# Patient Record
Sex: FEMALE | Race: BLACK OR AFRICAN AMERICAN | Employment: FULL TIME | URBAN - METROPOLITAN AREA
[De-identification: names, ages, dates, MRNs, and addresses within clinical notes are randomized per-mention and may not be internally consistent; named-entity substitution may affect disease eponyms.]

---

## 2017-03-10 ENCOUNTER — HOSPITAL ENCOUNTER (OUTPATIENT)
Dept: ULTRASOUND IMAGING | Age: 32
Discharge: HOME | End: 2017-03-10
Attending: NURSE PRACTITIONER
Payer: COMMERCIAL

## 2017-03-10 DIAGNOSIS — M79.661 PAIN IN RIGHT LOWER LEG: ICD-10-CM

## 2017-03-10 PROCEDURE — 93971 EXTREMITY STUDY: CPT

## 2020-04-28 PROBLEM — K59.01 SLOW TRANSIT CONSTIPATION: Status: ACTIVE | Noted: 2019-04-03

## 2020-05-13 PROBLEM — O20.0 THREATENED ABORTION: Status: ACTIVE | Noted: 2020-05-13

## 2020-05-24 RX ORDER — SODIUM CHLORIDE 0.9 % (FLUSH) 0.9 %
5-40 SYRINGE (ML) INJECTION AS NEEDED
Status: CANCELLED | OUTPATIENT
Start: 2020-05-24

## 2020-05-24 RX ORDER — SODIUM CHLORIDE 0.9 % (FLUSH) 0.9 %
5-40 SYRINGE (ML) INJECTION EVERY 8 HOURS
Status: CANCELLED | OUTPATIENT
Start: 2020-05-24

## 2020-05-24 NOTE — H&P
Carloz Garcia  is a 37 y.o. female, . No LMP recorded (lmp unknown). , who is being seen for follow up ultrasound on incomplete AB. Vaginal spotting only when wiping. Denies vaginal cramping. Ultrasound on 20: Uterine size of 65ml. Lining is 8mm. Uterus is antevered. Lining appears to be thick with debris and small amout of fluid at fundus. No gest sac or fetal pole seen. Ovaries wnl. No free fluid. Left adnexa with tubular hydrosalpinx seen- looks clear not blood filled. HCG Quant on 5/15/20: 878  HCG Quant on 20: 836    Ultrasound today's reveals cx contains small amount of simple fluid. Uterus is anteverted and heterogeneous. ENDO= 10 mm with debris and vascular components suggesting RPOC. ROV visualized with follicles and appear wnl. Right adnexa appears wnl. LOV visualized with follicles and appears wnl. Left adnexa contains paraovarian simple cysts measuring 1.7 cm and 0.9 cm. Only 2 visualized. Tubular structure not visualized. Discussed that still having bleeding and spotting. Offered cytotec to expulse uterine contents or D&C. This has been going on since . She is ready to be done. HISTORY:    OB History        3    Para   1    Term           1    AB   2    Living   1       SAB   2    TAB        Ectopic        Molar        Multiple        Live Births   1              GYN History         Sexual History:   Social History     Substance and Sexual Activity   Sexual Activity Not Currently    Partners: Male    Birth control/protection: None          has a past medical history of Abnormal Pap smear of cervix (2018), ASCUS with positive high risk HPV cervical (2018), Preeclampsia, and Vertigo.  .    Past Surgical History:   Procedure Laterality Date    HX  SECTION      x1    HX COLPOSCOPY  2015    With Bx- LGSIL       Her current meds are:    Current Outpatient Medications:     metroNIDAZOLE (FLAGYL) 500 mg tablet, Take BID x 7 days, Disp: 14 Tab, Rfl: 0    linaCLOtide (LINZESS) 145 mcg cap capsule, Take 1 Cap by mouth Daily (before breakfast). , Disp: 90 Cap, Rfl: 3    valACYclovir (VALTREX) 1 gram tablet, Take 1 Tab by mouth daily. Suppressive therapy, Disp: 90 Tab, Rfl: 3    FLUoxetine (PROZAC) 20 mg capsule, Take  by mouth daily. , Disp: , Rfl:     dextroamphetamine-amphetamine (ADDERALL) 15 mg tablet, Take 30 mg by mouth.     , Disp: , Rfl:     famciclovir (FAMVIR) 250 mg tablet, BID x 2 days, Disp: 30 Tab, Rfl: 0    ospemifene (OSPHENA) 60 mg tab tablet, Take 1 Tab by mouth daily. , Disp: 30 Tab, Rfl: 5    temazepam (RESTORIL) 30 mg capsule, Take  by mouth nightly as needed for Sleep., Disp: , Rfl:      ROS   Neg except hpi     PHYSICAL EXAM:  Visit Vitals  /68   Temp 97.5 °F (36.4 °C) (Temporal)   Ht 5' 7\" (1.702 m)   Wt 175 lb (79.4 kg)   LMP  (LMP Unknown)   BMI 27.41 kg/m²     Physical Exam   General:  Well nourished well developed female in nad   Heart rrr  Lungs cta b&s  Abdomen soft nontender. No enlargement of liver. Extremities: no calf pain    ASSESSMENT / PLAN:  Diagnoses and all orders for this visit:    1. Threatened  in early pregnancy  -     [facility]    2. Retained products of conception, early pregnancy  -     US UTS TRANSVAGINAL OB    discussed options. Would like to proceed with cytotec with risk bleeding, infection uterine perforation. She is aware. Will schedule. Npo after midnight.        Yasmeen Way DO

## 2020-05-25 ENCOUNTER — ANESTHESIA EVENT (OUTPATIENT)
Dept: SURGERY | Age: 35
End: 2020-05-25
Payer: COMMERCIAL

## 2020-05-25 RX ORDER — FENTANYL CITRATE 50 UG/ML
100 INJECTION, SOLUTION INTRAMUSCULAR; INTRAVENOUS AS NEEDED
Status: CANCELLED | OUTPATIENT
Start: 2020-05-25

## 2020-05-26 ENCOUNTER — HOSPITAL ENCOUNTER (OUTPATIENT)
Age: 35
Setting detail: OUTPATIENT SURGERY
Discharge: HOME | End: 2020-05-26
Attending: OBSTETRICS & GYNECOLOGY | Admitting: OBSTETRICS & GYNECOLOGY
Payer: COMMERCIAL

## 2020-05-26 ENCOUNTER — ANESTHESIA (OUTPATIENT)
Dept: SURGERY | Age: 35
End: 2020-05-26
Payer: COMMERCIAL

## 2020-05-26 VITALS
HEART RATE: 87 BPM | RESPIRATION RATE: 16 BRPM | WEIGHT: 178.7 LBS | SYSTOLIC BLOOD PRESSURE: 106 MMHG | TEMPERATURE: 97.8 F | BODY MASS INDEX: 28.72 KG/M2 | OXYGEN SATURATION: 99 % | DIASTOLIC BLOOD PRESSURE: 65 MMHG | HEIGHT: 66 IN

## 2020-05-26 DIAGNOSIS — O20.0 THREATENED ABORTION: Primary | ICD-10-CM

## 2020-05-26 PROCEDURE — 77030018836 HC SOL IRR NACL ICUM -A: Performed by: OBSTETRICS & GYNECOLOGY

## 2020-05-26 PROCEDURE — 74011250636 HC RX REV CODE- 250/636: Performed by: ANESTHESIOLOGY

## 2020-05-26 PROCEDURE — 74011250637 HC RX REV CODE- 250/637: Performed by: ANESTHESIOLOGY

## 2020-05-26 PROCEDURE — 74011250636 HC RX REV CODE- 250/636: Performed by: NURSE ANESTHETIST, CERTIFIED REGISTERED

## 2020-05-26 PROCEDURE — 77030009368: Performed by: OBSTETRICS & GYNECOLOGY

## 2020-05-26 PROCEDURE — 77030040922 HC BLNKT HYPOTHRM STRY -A: Performed by: ANESTHESIOLOGY

## 2020-05-26 PROCEDURE — 88305 TISSUE EXAM BY PATHOLOGIST: CPT

## 2020-05-26 PROCEDURE — 74011000250 HC RX REV CODE- 250: Performed by: NURSE ANESTHETIST, CERTIFIED REGISTERED

## 2020-05-26 PROCEDURE — 77030008579 HC TBNG UTER SUC CARD -A: Performed by: OBSTETRICS & GYNECOLOGY

## 2020-05-26 PROCEDURE — 76010000154 HC OR TIME FIRST 0.5 HR: Performed by: OBSTETRICS & GYNECOLOGY

## 2020-05-26 PROCEDURE — 76210000006 HC OR PH I REC 0.5 TO 1 HR: Performed by: OBSTETRICS & GYNECOLOGY

## 2020-05-26 PROCEDURE — 77030012317 HC CATH URET INT COVD -A: Performed by: OBSTETRICS & GYNECOLOGY

## 2020-05-26 PROCEDURE — 76060000032 HC ANESTHESIA 0.5 TO 1 HR: Performed by: OBSTETRICS & GYNECOLOGY

## 2020-05-26 PROCEDURE — 77030010509 HC AIRWY LMA MSK TELE -A: Performed by: ANESTHESIOLOGY

## 2020-05-26 PROCEDURE — 76210000020 HC REC RM PH II FIRST 0.5 HR: Performed by: OBSTETRICS & GYNECOLOGY

## 2020-05-26 PROCEDURE — 77030040361 HC SLV COMPR DVT MDII -B: Performed by: OBSTETRICS & GYNECOLOGY

## 2020-05-26 RX ORDER — IBUPROFEN 600 MG/1
600 TABLET ORAL
Qty: 20 TAB | Refills: 0 | Status: SHIPPED | OUTPATIENT
Start: 2020-05-26

## 2020-05-26 RX ORDER — SODIUM CHLORIDE, SODIUM LACTATE, POTASSIUM CHLORIDE, CALCIUM CHLORIDE 600; 310; 30; 20 MG/100ML; MG/100ML; MG/100ML; MG/100ML
1000 INJECTION, SOLUTION INTRAVENOUS CONTINUOUS
Status: DISCONTINUED | OUTPATIENT
Start: 2020-05-26 | End: 2020-05-26 | Stop reason: HOSPADM

## 2020-05-26 RX ORDER — MIDAZOLAM HYDROCHLORIDE 1 MG/ML
2 INJECTION, SOLUTION INTRAMUSCULAR; INTRAVENOUS
Status: COMPLETED | OUTPATIENT
Start: 2020-05-26 | End: 2020-05-26

## 2020-05-26 RX ORDER — HYDROCODONE BITARTRATE AND ACETAMINOPHEN 5; 325 MG/1; MG/1
1 TABLET ORAL
Qty: 12 TAB | Refills: 0 | Status: SHIPPED | OUTPATIENT
Start: 2020-05-26 | End: 2020-05-29

## 2020-05-26 RX ORDER — OXYCODONE HYDROCHLORIDE 5 MG/1
5 TABLET ORAL
Status: DISCONTINUED | OUTPATIENT
Start: 2020-05-26 | End: 2020-05-26 | Stop reason: HOSPADM

## 2020-05-26 RX ORDER — SODIUM CHLORIDE, SODIUM LACTATE, POTASSIUM CHLORIDE, CALCIUM CHLORIDE 600; 310; 30; 20 MG/100ML; MG/100ML; MG/100ML; MG/100ML
75 INJECTION, SOLUTION INTRAVENOUS CONTINUOUS
Status: DISCONTINUED | OUTPATIENT
Start: 2020-05-26 | End: 2020-05-26 | Stop reason: HOSPADM

## 2020-05-26 RX ORDER — ONDANSETRON 2 MG/ML
4 INJECTION INTRAMUSCULAR; INTRAVENOUS ONCE
Status: DISCONTINUED | OUTPATIENT
Start: 2020-05-26 | End: 2020-05-26 | Stop reason: HOSPADM

## 2020-05-26 RX ORDER — LIDOCAINE HYDROCHLORIDE 10 MG/ML
0.1 INJECTION INFILTRATION; PERINEURAL AS NEEDED
Status: DISCONTINUED | OUTPATIENT
Start: 2020-05-26 | End: 2020-05-26 | Stop reason: HOSPADM

## 2020-05-26 RX ORDER — NALOXONE HYDROCHLORIDE 0.4 MG/ML
0.1 INJECTION, SOLUTION INTRAMUSCULAR; INTRAVENOUS; SUBCUTANEOUS AS NEEDED
Status: DISCONTINUED | OUTPATIENT
Start: 2020-05-26 | End: 2020-05-26 | Stop reason: HOSPADM

## 2020-05-26 RX ORDER — HYDROMORPHONE HYDROCHLORIDE 2 MG/ML
0.5 INJECTION, SOLUTION INTRAMUSCULAR; INTRAVENOUS; SUBCUTANEOUS
Status: DISCONTINUED | OUTPATIENT
Start: 2020-05-26 | End: 2020-05-26 | Stop reason: HOSPADM

## 2020-05-26 RX ORDER — LIDOCAINE HYDROCHLORIDE 20 MG/ML
INJECTION, SOLUTION EPIDURAL; INFILTRATION; INTRACAUDAL; PERINEURAL AS NEEDED
Status: DISCONTINUED | OUTPATIENT
Start: 2020-05-26 | End: 2020-05-26 | Stop reason: HOSPADM

## 2020-05-26 RX ORDER — ONDANSETRON 2 MG/ML
INJECTION INTRAMUSCULAR; INTRAVENOUS AS NEEDED
Status: DISCONTINUED | OUTPATIENT
Start: 2020-05-26 | End: 2020-05-26 | Stop reason: HOSPADM

## 2020-05-26 RX ORDER — IBUPROFEN 600 MG/1
600 TABLET ORAL
Qty: 20 TAB | Refills: 0 | Status: SHIPPED | OUTPATIENT
Start: 2020-05-26 | End: 2020-05-26 | Stop reason: SDUPTHER

## 2020-05-26 RX ORDER — HYDROCODONE BITARTRATE AND ACETAMINOPHEN 5; 325 MG/1; MG/1
1 TABLET ORAL
Qty: 12 TAB | Refills: 0 | Status: SHIPPED | OUTPATIENT
Start: 2020-05-26 | End: 2020-05-26 | Stop reason: SDUPTHER

## 2020-05-26 RX ORDER — PROPOFOL 10 MG/ML
INJECTION, EMULSION INTRAVENOUS AS NEEDED
Status: DISCONTINUED | OUTPATIENT
Start: 2020-05-26 | End: 2020-05-26 | Stop reason: HOSPADM

## 2020-05-26 RX ORDER — DEXAMETHASONE SODIUM PHOSPHATE 4 MG/ML
INJECTION, SOLUTION INTRA-ARTICULAR; INTRALESIONAL; INTRAMUSCULAR; INTRAVENOUS; SOFT TISSUE AS NEEDED
Status: DISCONTINUED | OUTPATIENT
Start: 2020-05-26 | End: 2020-05-26 | Stop reason: HOSPADM

## 2020-05-26 RX ORDER — DIPHENHYDRAMINE HYDROCHLORIDE 50 MG/ML
12.5 INJECTION, SOLUTION INTRAMUSCULAR; INTRAVENOUS ONCE
Status: DISCONTINUED | OUTPATIENT
Start: 2020-05-26 | End: 2020-05-26 | Stop reason: HOSPADM

## 2020-05-26 RX ORDER — OXYCODONE HYDROCHLORIDE 5 MG/1
10 TABLET ORAL
Status: COMPLETED | OUTPATIENT
Start: 2020-05-26 | End: 2020-05-26

## 2020-05-26 RX ORDER — FENTANYL CITRATE 50 UG/ML
INJECTION, SOLUTION INTRAMUSCULAR; INTRAVENOUS AS NEEDED
Status: DISCONTINUED | OUTPATIENT
Start: 2020-05-26 | End: 2020-05-26 | Stop reason: HOSPADM

## 2020-05-26 RX ADMIN — ONDANSETRON 4 MG: 2 INJECTION INTRAMUSCULAR; INTRAVENOUS at 11:30

## 2020-05-26 RX ADMIN — OXYCODONE 10 MG: 5 TABLET ORAL at 12:20

## 2020-05-26 RX ADMIN — SODIUM CHLORIDE, SODIUM LACTATE, POTASSIUM CHLORIDE, AND CALCIUM CHLORIDE 1000 ML: 600; 310; 30; 20 INJECTION, SOLUTION INTRAVENOUS at 09:29

## 2020-05-26 RX ADMIN — SODIUM CHLORIDE, SODIUM LACTATE, POTASSIUM CHLORIDE, AND CALCIUM CHLORIDE: 600; 310; 30; 20 INJECTION, SOLUTION INTRAVENOUS at 11:17

## 2020-05-26 RX ADMIN — FENTANYL CITRATE 50 MCG: 50 INJECTION INTRAMUSCULAR; INTRAVENOUS at 11:22

## 2020-05-26 RX ADMIN — LIDOCAINE HYDROCHLORIDE 60 MG: 20 INJECTION, SOLUTION EPIDURAL; INFILTRATION; INTRACAUDAL; PERINEURAL at 11:22

## 2020-05-26 RX ADMIN — DEXAMETHASONE SODIUM PHOSPHATE 10 MG: 4 INJECTION, SOLUTION INTRAMUSCULAR; INTRAVENOUS at 11:30

## 2020-05-26 RX ADMIN — FENTANYL CITRATE 50 MCG: 50 INJECTION INTRAMUSCULAR; INTRAVENOUS at 11:32

## 2020-05-26 RX ADMIN — PROPOFOL 200 MG: 10 INJECTION, EMULSION INTRAVENOUS at 11:22

## 2020-05-26 RX ADMIN — MIDAZOLAM 2 MG: 1 INJECTION INTRAMUSCULAR; INTRAVENOUS at 10:11

## 2020-05-26 RX ADMIN — HYDROMORPHONE HYDROCHLORIDE 0.5 MG: 2 INJECTION INTRAMUSCULAR; INTRAVENOUS; SUBCUTANEOUS at 11:56

## 2020-05-26 NOTE — ANESTHESIA PREPROCEDURE EVALUATION
Relevant Problems   No relevant active problems       Anesthetic History   No history of anesthetic complications            Review of Systems / Medical History  Patient summary reviewed and pertinent labs reviewed    Pulmonary  Within defined limits                 Neuro/Psych              Cardiovascular    Hypertension                   GI/Hepatic/Renal                Endo/Other  Within defined limits           Other Findings   Comments: Pre E in past           Physical Exam    Airway  Mallampati: II  TM Distance: 4 - 6 cm  Neck ROM: normal range of motion   Mouth opening: Normal     Cardiovascular    Rhythm: regular  Rate: normal         Dental  No notable dental hx       Pulmonary  Breath sounds clear to auscultation               Abdominal  GI exam deferred       Other Findings            Anesthetic Plan    ASA: 2  Anesthesia type: general            Anesthetic plan and risks discussed with: Patient

## 2020-05-26 NOTE — ANESTHESIA POSTPROCEDURE EVALUATION
Procedure(s):  DILATATION AND CURETTAGE WITH SUCTION/ RETAINED PRODUCTS.     general    Anesthesia Post Evaluation        Patient location during evaluation: PACU  Patient participation: complete - patient participated  Level of consciousness: awake  Pain management: satisfactory to patient  Airway patency: patent  Anesthetic complications: no  Cardiovascular status: hemodynamically stable  Respiratory status: spontaneous ventilation  Hydration status: euvolemic  Post anesthesia nausea and vomiting:  none      Vitals Value Taken Time   /65 5/26/2020 12:21 PM   Temp 36.6 °C (97.8 °F) 5/26/2020 11:51 AM   Pulse 87 5/26/2020 12:21 PM   Resp 16 5/26/2020 12:21 PM   SpO2 99 % 5/26/2020 12:21 PM

## 2020-05-26 NOTE — BRIEF OP NOTE
Brief Postoperative Note    Patient: Stan Muñoz  YOB: 1985  MRN: [id number]    Date of Procedure: 5/26/2020     Pre-Op Diagnosis: Retained products of conception after miscarriage [O03.4]    Post-Op Diagnosis: Same as preoperative diagnosis.       Procedure(s):  DILATATION AND CURETTAGE WITH SUCTION/ RETAINED PRODUCTS    Surgeon(s):  Mary Richardson MD    Surgical Assistant: None    Anesthesia: General     Estimated Blood Loss (mL): less than 537     Complications: None    Specimens:   ID Type Source Tests Collected by Time Destination   1 : Products of conception ALICIA Peralesinz of Conception  Mary Richardson MD 5/26/2020 1106 Pathology        Findings: Normal EGBSUVVC, moderate amount of POC    Electronically Signed by Edwige Iniguez MD on 5/26/2020 at 11:45 AM

## 2020-05-26 NOTE — OP NOTES
[address] [address]  OPERATIVE REPORT    Name:  Jacek Orta  MR#:  [id number]  :  1985  ACCOUNT #:  [de-identified]  DATE OF SERVICE:  2020    PREOPERATIVE DIAGNOSIS:  Retained products of pregnancy after miscarriage. POSTOPERATIVE DIAGNOSIS:  Retained products of pregnancy after miscarriage. PROCEDURE PERFORMED:  Suction dilatation and curettage. SURGEON:  Janie Sanz MD    ANESTHESIA:  General anesthesia. COMPLICATIONS:  None. SPECIMENS REMOVED:  Products of conception. ESTIMATED BLOOD LOSS:  100 mL. IV FLUIDS:  500 mL of crystalloid. URINE OUTPUT:  100 mL of clear urine drained prior to the case. FINDINGS:  Normal-appearing external genitalia, Bartholin, Morning Sun, urethral glands, vulva, vagina, and cervix. Cervix was noted to be dilated to a diameter of 27-South African without the need for mechanical dilatation. PROCEDURE:  The patient was taken to the operating room, where general anesthesia was obtained without difficulty. She was sterilely prepped and draped in dorsal lithotomy position in [address] [place] [address] in the usual sterile fashion. After confirming the patient's identification and the nature of the procedure, a weighted sterile speculum was placed in the patient's posterior fornix of the vagina and the anterior lip of the cervix was visualized with a narrow Sabine. It was grasped with a single-tooth tenaculum and the narrow Sabine was removed. The uterus sounded to a depth of 8 cm. The Hegar dilators were utilized to assess the cervical dilatation and the patient was found to be dilated spontaneously to a diameter of 27-South African without the need for mechanical dilatation. The curved 7 mm suction curette was gently introduced into the endometrial cavity and suction applied. The curette was withdrawn in a spinning fashion effectively evacuating all products of conception.   Sharp curettage was then undertaken until a gritty texture was appreciated throughout the entire endometrial cavity and the suction curette was gently introduced again into the endometrial cavity. It was again withdrawn in a spinning fashion effectively evacuating any remaining products of conception. Minimal bleeding was noted throughout the procedure. The tenaculum was removed from the cervix with minimal bleeding noted from tenaculum sites and the case deemed concluded. The patient tolerated the procedure well. All counts were correct. The patient went to recovery room in stable condition.       Renetta Langston MD      [id number]  D:  05/26/2020 11:49  T:  05/26/2020 19:07  JOB #:  [id number]

## 2020-05-26 NOTE — PERIOP NOTES
[address] May 26, 2020       RE: Indra Humphrey      To Whom It May Concern,    This is to certify that Indra Humphrey may may return to work on 05/28/2020. Please feel free to contact my office if you have any questions or concerns. Thank you for your assistance in this matter.       Sincerely,  Dr. Ifeanyi Crawley  [phone]

## 2020-05-26 NOTE — DISCHARGE INSTRUCTIONS
Dilation and Curettage (D&C): What to Expect at Home  Your Recovery  Dilation and curettage (D&C) is a procedure to remove tissue from the inside of the uterus. The doctor used a curved tool, called a curette, to gently scrape tissue from your uterus. You are likely to have a backache, or cramps similar to menstrual cramps, and pass small clots of blood from your vagina for the first few days. You may continue to have light vaginal bleeding for several weeks after the procedure. You will probably be able to go back to most of your normal activities in 1 or 2 days. This care sheet gives you a general idea about how long it will take for you to recover. But each person recovers at a different pace. Follow the steps below to get better as quickly as possible. How can you care for yourself at home? Activity  · Rest when you feel tired. Getting enough sleep will help you recover. · Avoid strenuous activities, such as bicycle riding, jogging, weight lifting, or aerobic exercise, until your doctor says it is okay. · Most women are able to return to work the day after the procedure. · You may have some light vaginal bleeding. Wear sanitary pads if needed. Do not douche or use tampons for 2 weeks or until your doctor says it is okay. · No vaginal intercourse for 2 weeks or until cleared by your doctor. Diet  · You can eat your normal diet. If your stomach is upset, try bland, low-fat foods like plain rice, broiled chicken, toast, and yogurt. · Drink plenty of fluids (unless your doctor tells you not to). Medicines  · Take pain medicines exactly as directed. ¨ If the doctor gave you a prescription medicine for pain, take it as prescribed. ¨ If you are not taking a prescription pain medicine, ask your doctor if you can take an over-the-counter medicine. · If you think your pain medicine is making you sick to your stomach:  ¨ Take your medicine after meals (unless your doctor has told you not to).   ¨ Ask your doctor for a different pain medicine. · If your doctor prescribed antibiotics, take them as directed. Do not stop taking them just because you feel better. You need to take the full course of antibiotics. Follow-up care is a key part of your treatment and safety. Be sure to make and go to all appointments, and call your doctor if you are having problems. Its also a good idea to know your test results and keep a list of the medicines you take. When should you call for help? Call 911 anytime you think you may need emergency care. For example, call if:  · You passed out (lost consciousness). · You have severe trouble breathing. · You have chest pain and shortness of breath, or you cough up blood. · You have severe pain in your belly. Call your doctor now or seek immediate medical care if:  · You have bright red vaginal bleeding that soaks one or more pads each hour for 2 or more hours. · You pass blood clots that are larger than a golf ball. · You have vaginal discharge that smells bad. · You are sick to your stomach or cannot keep fluids down. · You have pain that does not get better after you take pain medicine. · You have pain that is getting worse 2 days after the procedure. · You have a fever over 100°F.  · Your belly feels tender, or full and hard. Watch closely for changes in your health, and be sure to contact your doctor if:  You do not get better as expected. After general anesthesia or intravenous sedation, for 24 hours or while taking prescription Narcotics:  · Limit your activities  · A responsible adult needs to be with you for the next 24 hours  · Do not drive and operate hazardous machinery  · Do not make important personal or business decisions  · Do not drink alcoholic beverages  · If you have not urinated within 8 hours after discharge, please contact your surgeon on call. · If you have sleep apnea and have a CPAP machine, please use it for all naps and sleeping.   · Please use caution when taking narcotics and any of your home medications that may cause drowsiness. *  Please give a list of your current medications to your Primary Care Provider. *  Please update this list whenever your medications are discontinued, doses are      changed, or new medications (including over-the-counter products) are added. *  Please carry medication information at all times in case of emergency situations. These are general instructions for a healthy lifestyle:  No smoking/ No tobacco products/ Avoid exposure to second hand smoke  Surgeon General's Warning:  Quitting smoking now greatly reduces serious risk to your health. Obesity, smoking, and sedentary lifestyle greatly increases your risk for illness  A healthy diet, regular physical exercise & weight monitoring are important for maintaining a healthy lifestyle    You may be retaining fluid if you have a history of heart failure or if you experience any of the following symptoms:  Weight gain of 3 pounds or more overnight or 5 pounds in a week, increased swelling in our hands or feet or shortness of breath while lying flat in bed. Please call your doctor as soon as you notice any of these symptoms; do not wait until your next office visit.

## 2022-03-18 PROBLEM — K59.01 SLOW TRANSIT CONSTIPATION: Status: ACTIVE | Noted: 2019-04-03

## 2022-03-19 PROBLEM — O20.0 THREATENED ABORTION: Status: ACTIVE | Noted: 2020-05-13

## 2022-09-07 ENCOUNTER — OFFICE VISIT (OUTPATIENT)
Dept: OBGYN CLINIC | Age: 37
End: 2022-09-07
Payer: COMMERCIAL

## 2022-09-07 VITALS
DIASTOLIC BLOOD PRESSURE: 68 MMHG | SYSTOLIC BLOOD PRESSURE: 118 MMHG | BODY MASS INDEX: 33.62 KG/M2 | WEIGHT: 214.2 LBS | HEIGHT: 67 IN

## 2022-09-07 DIAGNOSIS — Z11.3 SCREENING EXAMINATION FOR VENEREAL DISEASE: ICD-10-CM

## 2022-09-07 DIAGNOSIS — Z11.3 SCREEN FOR STD (SEXUALLY TRANSMITTED DISEASE): ICD-10-CM

## 2022-09-07 DIAGNOSIS — Z13.89 SCREENING FOR GENITOURINARY CONDITION: ICD-10-CM

## 2022-09-07 DIAGNOSIS — Z01.419 WELL WOMAN EXAM: Primary | ICD-10-CM

## 2022-09-07 DIAGNOSIS — Z11.51 SCREENING FOR HPV (HUMAN PAPILLOMAVIRUS): ICD-10-CM

## 2022-09-07 LAB
BILIRUBIN, URINE, POC: NEGATIVE
BLOOD URINE, POC: NEGATIVE
GLUCOSE URINE, POC: NEGATIVE
KETONES, URINE, POC: NEGATIVE
LEUKOCYTE ESTERASE, URINE, POC: NEGATIVE
NITRITE, URINE, POC: NEGATIVE
PH, URINE, POC: 6 (ref 4.6–8)
PROTEIN,URINE, POC: NEGATIVE
SPECIFIC GRAVITY, URINE, POC: 1.02 (ref 1–1.03)
URINALYSIS CLARITY, POC: CLEAR
URINALYSIS COLOR, POC: YELLOW
UROBILINOGEN, POC: NORMAL

## 2022-09-07 PROCEDURE — 99395 PREV VISIT EST AGE 18-39: CPT | Performed by: NURSE PRACTITIONER

## 2022-09-07 PROCEDURE — 81003 URINALYSIS AUTO W/O SCOPE: CPT | Performed by: NURSE PRACTITIONER

## 2022-09-07 RX ORDER — SERTRALINE HYDROCHLORIDE 100 MG/1
100 TABLET, FILM COATED ORAL DAILY
COMMUNITY

## 2022-09-07 NOTE — PROGRESS NOTES
Patient presents today for a routine gynecological examination with no complaints. Would like STD screening. Denies symptoms/known exposure. OB History          3    Para        Term           1    AB   2    Living   1         SAB        IAB        Ectopic        Molar        Multiple        Live Births                      GYN History     Last pap: 3/11/19 Neg/HPV Neg/Neg Cxs      Patient's last menstrual period was 2022 (exact date). Cycle Length 21 Lasting 3  negative dysmenorrhea; negative postcoital bleeding    Past Medical History:  Past Medical History:   Diagnosis Date    Abnormal Pap smear of cervix 2018    HPV+    ASCUS with positive high risk HPV cervical 2018    Preeclampsia     with first pregnancy    Vertigo        Past Surgical History:  Past Surgical History:   Procedure Laterality Date     SECTION      x1    COLPOSCOPY  2015    With Bx- LGSIL       Allergies:   No Known Allergies    Medication History:  Current Outpatient Medications   Medication Sig Dispense Refill    sertraline (ZOLOFT) 100 MG tablet Take 100 mg by mouth daily      amphetamine-dextroamphetamine (ADDERALL) 15 MG tablet Take 30 mg by mouth. No current facility-administered medications for this visit. Social History:  Social History     Socioeconomic History    Marital status: Single     Spouse name: Not on file    Number of children: Not on file    Years of education: Not on file    Highest education level: Not on file   Occupational History    Not on file   Tobacco Use    Smoking status: Never    Smokeless tobacco: Never   Substance and Sexual Activity    Alcohol use:  Yes     Alcohol/week: 0.0 standard drinks    Drug use: No    Sexual activity: Yes     Partners: Male     Birth control/protection: None   Other Topics Concern    Not on file   Social History Narrative    Lives with daughter Anisa Chavarria 08  In an on an doff relationship     Social Determinants of Health MICRO  - PAP IG, CT-NG-TV, Aptima HPV and rfx 16/18,45 ([id number]); Future  - PAP IG, CT-NG-TV, Aptima HPV and rfx 16/18,45 ([id number])    2. Screening for HPV (human papillomavirus)    - PAP IG, CT-NG-TV, Aptima HPV and rfx 16/18,45 ([id number]); Future  - PAP IG, CT-NG-TV, Aptima HPV and rfx 16/18,45 ([id number])    3. Screening for genitourinary condition    - AMB POC URINALYSIS DIP STICK AUTO W/O MICRO    4. Screening examination for venereal disease    - PAP IG, CT-NG-TV, Aptima HPV and rfx 16/18,45 ([id number]); Future  - PAP IG, CT-NG-TV, Aptima HPV and rfx 16/18,45 ([id number])    5. Screen for STD (sexually transmitted disease)    - HIV 1/2 Ag/Ab, 4TH Generation,W Rflx Confirm; Future  - Hepatitis C Antibody; Future  - Hepatitis B Surface Antigen; Future  - RPR; Future     pap smear with cultures  Would like blood testing also  return annually or prn    Supervising physician is Dr. Myla Bourne.

## 2022-09-08 LAB
HBV SURFACE AG SER QL: NONREACTIVE
HCV AB SER QL: NONREACTIVE
HIV 1+2 AB+HIV1 P24 AG SERPL QL IA: NONREACTIVE
HIV 1/2 RESULT COMMENT: NORMAL
RPR SER QL: NONREACTIVE

## 2022-09-12 LAB
C TRACH RRNA CVX QL NAA+PROBE: NEGATIVE
CYTOLOGIST CVX/VAG CYTO: NORMAL
CYTOLOGY CVX/VAG DOC THIN PREP: NORMAL
HPV APTIMA: NEGATIVE
Lab: NORMAL
N GONORRHOEA RRNA CVX QL NAA+PROBE: NEGATIVE
PATH REPORT.FINAL DX SPEC: NORMAL
STAT OF ADQ CVX/VAG CYTO-IMP: NORMAL
T VAGINALIS RRNA SPEC QL NAA+PROBE: NEGATIVE

## 2022-11-14 ENCOUNTER — HOSPITAL ENCOUNTER (OUTPATIENT)
Dept: LAB | Age: 37
Discharge: HOME | End: 2022-11-17
Payer: SELF-PAY

## 2022-11-14 LAB — HGB BLD-MCNC: 13 G/DL (ref 11.7–15.4)

## 2022-11-14 PROCEDURE — 36415 COLL VENOUS BLD VENIPUNCTURE: CPT

## 2022-11-14 PROCEDURE — 85018 HEMOGLOBIN: CPT

## 2022-11-14 RX ORDER — ACETAMINOPHEN 500 MG
1000 TABLET ORAL 2 TIMES DAILY
COMMUNITY

## 2022-11-14 RX ORDER — LANOLIN ALCOHOL/MO/W.PET/CERES
325 CREAM (GRAM) TOPICAL
COMMUNITY
Start: 2021-03-19

## 2022-11-14 NOTE — PERIOP NOTE
Phone pre-assessment completed. Verified name&  . Order to obtain consent not found in EHR, however patient verifies case posting. Type 1B surgery,  assessment complete. Orders NOT received. Labs per surgeon: unknown  Labs per anesthesia protocol: HGB- signed and held- Pt to come to [facility] today 22. Chart marked for charge nurse review. Medical/surgical history questions answered at their best of ability. All prior to admission medications reviewed and documented in [facility]. Instructed to take ONLY THE FOLLOWING MEDICATIONS ON THE DAY OF SURGERY according to anesthesia guidelines with sips of water: none . VERBALIZES UNDERSTANDING TO HOLD ALL VITAMINS AND SUPPLEMENTS 7 DAYS PRIOR TO SURGERY DATE (with exception to Iron supplement) and NSAIDS 5 DAYS PRIOR TO SURGERY DATE PER ANESTHESIA PROTOCOL. Instructed on the following:    > Arrive at [facility], time of arrival to be called the day before by 1700  > NPO after midnight including gum, mints, and ice chips  > Responsible adult must drive patient to the hospital, stay during surgery, and patient will need supervision 24 hours after anesthesia  > Use antibacterial soap in shower the night before surgery and on the morning of surgery  > All piercings must be removed prior to arrival.    > Leave all valuables (money and jewelry) at home but bring insurance card and ID on DOS.   > You may be required to pay a deductible or co-pay on the day of your procedure. You can pre-pay by calling [phone] if your surgery is at the [facility] or [phone] if your surgery is at the [facility]. > Do not wear make-up, nail polish, lotions, cologne, perfumes, powders, or oil on skin. Artificial nails are not permitted. Teach back successful and demonstrates knowledge of instruction. You will received a call from the pre-op nurse by 5 pm on the business day prior to the scheduled procedure.  If you have not spoken with a nurse, please check your voicemail. If you have not received an arrival time by 5 pm, please call [phone].

## 2022-11-15 ENCOUNTER — ANESTHESIA EVENT (OUTPATIENT)
Dept: SURGERY | Age: 37
End: 2022-11-15

## 2022-11-16 ENCOUNTER — ANESTHESIA (OUTPATIENT)
Dept: SURGERY | Age: 37
End: 2022-11-16

## 2022-11-16 ENCOUNTER — HOSPITAL ENCOUNTER (OUTPATIENT)
Age: 37
Setting detail: OUTPATIENT SURGERY
Discharge: HOME | End: 2022-11-16
Attending: SPECIALIST | Admitting: SPECIALIST

## 2022-11-16 VITALS
HEART RATE: 87 BPM | TEMPERATURE: 97 F | RESPIRATION RATE: 16 BRPM | DIASTOLIC BLOOD PRESSURE: 71 MMHG | OXYGEN SATURATION: 95 % | HEIGHT: 66 IN | BODY MASS INDEX: 34.55 KG/M2 | SYSTOLIC BLOOD PRESSURE: 122 MMHG | WEIGHT: 215 LBS

## 2022-11-16 LAB
ANION GAP SERPL CALC-SCNC: 6 MMOL/L (ref 2–11)
BASOPHILS # BLD: 0.1 K/UL (ref 0–0.2)
BASOPHILS NFR BLD: 1 % (ref 0–2)
BUN SERPL-MCNC: 10 MG/DL (ref 6–23)
CALCIUM SERPL-MCNC: 8.9 MG/DL (ref 8.3–10.4)
CHLORIDE SERPL-SCNC: 109 MMOL/L (ref 101–110)
CO2 SERPL-SCNC: 24 MMOL/L (ref 21–32)
CREAT SERPL-MCNC: 0.9 MG/DL (ref 0.6–1)
DIFFERENTIAL METHOD BLD: ABNORMAL
EOSINOPHIL # BLD: 0.1 K/UL (ref 0–0.8)
EOSINOPHIL NFR BLD: 2 % (ref 0.5–7.8)
ERYTHROCYTE [DISTWIDTH] IN BLOOD BY AUTOMATED COUNT: 15.5 % (ref 11.9–14.6)
GLUCOSE SERPL-MCNC: 94 MG/DL (ref 65–100)
HCG UR QL: NEGATIVE
HCT VFR BLD AUTO: 41.7 % (ref 35.8–46.3)
HGB BLD-MCNC: 13.1 G/DL (ref 11.7–15.4)
IMM GRANULOCYTES # BLD AUTO: 0 K/UL (ref 0–0.5)
IMM GRANULOCYTES NFR BLD AUTO: 0 % (ref 0–5)
LYMPHOCYTES # BLD: 2.3 K/UL (ref 0.5–4.6)
LYMPHOCYTES NFR BLD: 41 % (ref 13–44)
MCH RBC QN AUTO: 26.6 PG (ref 26.1–32.9)
MCHC RBC AUTO-ENTMCNC: 31.4 G/DL (ref 31.4–35)
MCV RBC AUTO: 84.6 FL (ref 82–102)
MONOCYTES # BLD: 0.5 K/UL (ref 0.1–1.3)
MONOCYTES NFR BLD: 9 % (ref 4–12)
NEUTS SEG # BLD: 2.7 K/UL (ref 1.7–8.2)
NEUTS SEG NFR BLD: 47 % (ref 43–78)
NRBC # BLD: 0 K/UL (ref 0–0.2)
PLATELET # BLD AUTO: 320 K/UL (ref 150–450)
PMV BLD AUTO: 9.8 FL (ref 9.4–12.3)
POTASSIUM SERPL-SCNC: 4.8 MMOL/L (ref 3.5–5.1)
RBC # BLD AUTO: 4.93 M/UL (ref 4.05–5.2)
SODIUM SERPL-SCNC: 139 MMOL/L (ref 133–143)
WBC # BLD AUTO: 5.6 K/UL (ref 4.3–11.1)

## 2022-11-16 PROCEDURE — 2580000003 HC RX 258: Performed by: ANESTHESIOLOGY

## 2022-11-16 PROCEDURE — 6370000000 HC RX 637 (ALT 250 FOR IP): Performed by: ANESTHESIOLOGY

## 2022-11-16 PROCEDURE — 6360000002 HC RX W HCPCS: Performed by: SPECIALIST

## 2022-11-16 PROCEDURE — 3600000003 HC SURGERY LEVEL 3 BASE: Performed by: SPECIALIST

## 2022-11-16 PROCEDURE — 6370000000 HC RX 637 (ALT 250 FOR IP): Performed by: SPECIALIST

## 2022-11-16 PROCEDURE — 3700000000 HC ANESTHESIA ATTENDED CARE: Performed by: SPECIALIST

## 2022-11-16 PROCEDURE — 81025 URINE PREGNANCY TEST: CPT

## 2022-11-16 PROCEDURE — 6360000002 HC RX W HCPCS: Performed by: ANESTHESIOLOGY

## 2022-11-16 PROCEDURE — 3600000013 HC SURGERY LEVEL 3 ADDTL 15MIN: Performed by: SPECIALIST

## 2022-11-16 PROCEDURE — 2500000003 HC RX 250 WO HCPCS: Performed by: SPECIALIST

## 2022-11-16 PROCEDURE — 85025 COMPLETE CBC W/AUTO DIFF WBC: CPT

## 2022-11-16 PROCEDURE — 2500000003 HC RX 250 WO HCPCS: Performed by: NURSE ANESTHETIST, CERTIFIED REGISTERED

## 2022-11-16 PROCEDURE — 7100000010 HC PHASE II RECOVERY - FIRST 15 MIN: Performed by: SPECIALIST

## 2022-11-16 PROCEDURE — 2709999900 HC NON-CHARGEABLE SUPPLY: Performed by: SPECIALIST

## 2022-11-16 PROCEDURE — 2580000003 HC RX 258: Performed by: SPECIALIST

## 2022-11-16 PROCEDURE — 3700000001 HC ADD 15 MINUTES (ANESTHESIA): Performed by: SPECIALIST

## 2022-11-16 PROCEDURE — 7100000011 HC PHASE II RECOVERY - ADDTL 15 MIN: Performed by: SPECIALIST

## 2022-11-16 PROCEDURE — 7100000001 HC PACU RECOVERY - ADDTL 15 MIN: Performed by: SPECIALIST

## 2022-11-16 PROCEDURE — 80048 BASIC METABOLIC PNL TOTAL CA: CPT

## 2022-11-16 PROCEDURE — 7100000000 HC PACU RECOVERY - FIRST 15 MIN: Performed by: SPECIALIST

## 2022-11-16 PROCEDURE — 6360000002 HC RX W HCPCS: Performed by: NURSE ANESTHETIST, CERTIFIED REGISTERED

## 2022-11-16 RX ORDER — BACITRACIN, NEOMYCIN, POLYMYXIN B 400; 3.5; 5 [USP'U]/G; MG/G; [USP'U]/G
OINTMENT TOPICAL PRN
Status: DISCONTINUED | OUTPATIENT
Start: 2022-11-16 | End: 2022-11-16 | Stop reason: HOSPADM

## 2022-11-16 RX ORDER — SODIUM CHLORIDE 0.9 % (FLUSH) 0.9 %
5-40 SYRINGE (ML) INJECTION EVERY 12 HOURS SCHEDULED
Status: DISCONTINUED | OUTPATIENT
Start: 2022-11-16 | End: 2022-11-16 | Stop reason: HOSPADM

## 2022-11-16 RX ORDER — EPINEPHRINE 1 MG/ML(1)
AMPUL (ML) INJECTION PRN
Status: DISCONTINUED | OUTPATIENT
Start: 2022-11-16 | End: 2022-11-16 | Stop reason: HOSPADM

## 2022-11-16 RX ORDER — DIPHENHYDRAMINE HYDROCHLORIDE 50 MG/ML
12.5 INJECTION INTRAMUSCULAR; INTRAVENOUS
Status: DISCONTINUED | OUTPATIENT
Start: 2022-11-16 | End: 2022-11-16 | Stop reason: HOSPADM

## 2022-11-16 RX ORDER — APREPITANT 40 MG/1
40 CAPSULE ORAL ONCE
Status: COMPLETED | OUTPATIENT
Start: 2022-11-16 | End: 2022-11-16

## 2022-11-16 RX ORDER — SODIUM CHLORIDE 0.9 % (FLUSH) 0.9 %
5-40 SYRINGE (ML) INJECTION PRN
Status: DISCONTINUED | OUTPATIENT
Start: 2022-11-16 | End: 2022-11-16 | Stop reason: HOSPADM

## 2022-11-16 RX ORDER — PROCHLORPERAZINE EDISYLATE 5 MG/ML
5 INJECTION INTRAMUSCULAR; INTRAVENOUS
Status: COMPLETED | OUTPATIENT
Start: 2022-11-16 | End: 2022-11-16

## 2022-11-16 RX ORDER — GLYCOPYRROLATE 0.2 MG/ML
INJECTION INTRAMUSCULAR; INTRAVENOUS PRN
Status: DISCONTINUED | OUTPATIENT
Start: 2022-11-16 | End: 2022-11-16 | Stop reason: SDUPTHER

## 2022-11-16 RX ORDER — ROCURONIUM BROMIDE 10 MG/ML
INJECTION, SOLUTION INTRAVENOUS PRN
Status: DISCONTINUED | OUTPATIENT
Start: 2022-11-16 | End: 2022-11-16 | Stop reason: SDUPTHER

## 2022-11-16 RX ORDER — LIDOCAINE HYDROCHLORIDE 10 MG/ML
INJECTION, SOLUTION INFILTRATION; PERINEURAL PRN
Status: DISCONTINUED | OUTPATIENT
Start: 2022-11-16 | End: 2022-11-16 | Stop reason: HOSPADM

## 2022-11-16 RX ORDER — NEOSTIGMINE METHYLSULFATE 1 MG/ML
INJECTION, SOLUTION INTRAVENOUS PRN
Status: DISCONTINUED | OUTPATIENT
Start: 2022-11-16 | End: 2022-11-16 | Stop reason: SDUPTHER

## 2022-11-16 RX ORDER — FENTANYL CITRATE 50 UG/ML
INJECTION, SOLUTION INTRAMUSCULAR; INTRAVENOUS PRN
Status: DISCONTINUED | OUTPATIENT
Start: 2022-11-16 | End: 2022-11-16 | Stop reason: SDUPTHER

## 2022-11-16 RX ORDER — MIDAZOLAM HYDROCHLORIDE 2 MG/2ML
2 INJECTION, SOLUTION INTRAMUSCULAR; INTRAVENOUS
Status: DISCONTINUED | OUTPATIENT
Start: 2022-11-16 | End: 2022-11-16 | Stop reason: HOSPADM

## 2022-11-16 RX ORDER — HALOPERIDOL 5 MG/ML
1 INJECTION INTRAMUSCULAR
Status: DISCONTINUED | OUTPATIENT
Start: 2022-11-16 | End: 2022-11-16 | Stop reason: HOSPADM

## 2022-11-16 RX ORDER — OXYCODONE HYDROCHLORIDE 5 MG/1
5 TABLET ORAL
Status: COMPLETED | OUTPATIENT
Start: 2022-11-16 | End: 2022-11-16

## 2022-11-16 RX ORDER — SODIUM CHLORIDE, SODIUM LACTATE, POTASSIUM CHLORIDE, CALCIUM CHLORIDE 600; 310; 30; 20 MG/100ML; MG/100ML; MG/100ML; MG/100ML
INJECTION, SOLUTION INTRAVENOUS CONTINUOUS
Status: DISCONTINUED | OUTPATIENT
Start: 2022-11-16 | End: 2022-11-16 | Stop reason: HOSPADM

## 2022-11-16 RX ORDER — DEXTROSE MONOHYDRATE 100 MG/ML
INJECTION, SOLUTION INTRAVENOUS CONTINUOUS PRN
Status: DISCONTINUED | OUTPATIENT
Start: 2022-11-16 | End: 2022-11-16 | Stop reason: HOSPADM

## 2022-11-16 RX ORDER — LIDOCAINE HYDROCHLORIDE 10 MG/ML
1 INJECTION, SOLUTION INFILTRATION; PERINEURAL
Status: DISCONTINUED | OUTPATIENT
Start: 2022-11-16 | End: 2022-11-16 | Stop reason: HOSPADM

## 2022-11-16 RX ORDER — HYDROMORPHONE HYDROCHLORIDE 2 MG/ML
0.25 INJECTION, SOLUTION INTRAMUSCULAR; INTRAVENOUS; SUBCUTANEOUS EVERY 5 MIN PRN
Status: DISCONTINUED | OUTPATIENT
Start: 2022-11-16 | End: 2022-11-16 | Stop reason: HOSPADM

## 2022-11-16 RX ORDER — PROPOFOL 10 MG/ML
INJECTION, EMULSION INTRAVENOUS PRN
Status: DISCONTINUED | OUTPATIENT
Start: 2022-11-16 | End: 2022-11-16 | Stop reason: SDUPTHER

## 2022-11-16 RX ORDER — SCOLOPAMINE TRANSDERMAL SYSTEM 1 MG/1
1 PATCH, EXTENDED RELEASE TRANSDERMAL ONCE
Status: DISCONTINUED | OUTPATIENT
Start: 2022-11-16 | End: 2022-11-16 | Stop reason: HOSPADM

## 2022-11-16 RX ORDER — ONDANSETRON 2 MG/ML
INJECTION INTRAMUSCULAR; INTRAVENOUS PRN
Status: DISCONTINUED | OUTPATIENT
Start: 2022-11-16 | End: 2022-11-16 | Stop reason: SDUPTHER

## 2022-11-16 RX ORDER — ACETAMINOPHEN 500 MG
1000 TABLET ORAL ONCE
Status: COMPLETED | OUTPATIENT
Start: 2022-11-16 | End: 2022-11-16

## 2022-11-16 RX ORDER — SODIUM CHLORIDE 9 MG/ML
INJECTION, SOLUTION INTRAVENOUS PRN
Status: DISCONTINUED | OUTPATIENT
Start: 2022-11-16 | End: 2022-11-16 | Stop reason: HOSPADM

## 2022-11-16 RX ORDER — DEXAMETHASONE SODIUM PHOSPHATE 4 MG/ML
INJECTION, SOLUTION INTRA-ARTICULAR; INTRALESIONAL; INTRAMUSCULAR; INTRAVENOUS; SOFT TISSUE PRN
Status: DISCONTINUED | OUTPATIENT
Start: 2022-11-16 | End: 2022-11-16 | Stop reason: SDUPTHER

## 2022-11-16 RX ORDER — HYDROMORPHONE HYDROCHLORIDE 2 MG/ML
0.5 INJECTION, SOLUTION INTRAMUSCULAR; INTRAVENOUS; SUBCUTANEOUS EVERY 5 MIN PRN
Status: DISCONTINUED | OUTPATIENT
Start: 2022-11-16 | End: 2022-11-16 | Stop reason: HOSPADM

## 2022-11-16 RX ORDER — HEPARIN SODIUM 5000 [USP'U]/ML
5000 INJECTION, SOLUTION INTRAVENOUS; SUBCUTANEOUS ONCE
Status: COMPLETED | OUTPATIENT
Start: 2022-11-16 | End: 2022-11-16

## 2022-11-16 RX ORDER — LIDOCAINE HYDROCHLORIDE 20 MG/ML
INJECTION, SOLUTION EPIDURAL; INFILTRATION; INTRACAUDAL; PERINEURAL PRN
Status: DISCONTINUED | OUTPATIENT
Start: 2022-11-16 | End: 2022-11-16 | Stop reason: SDUPTHER

## 2022-11-16 RX ORDER — SODIUM CHLORIDE, SODIUM LACTATE, POTASSIUM CHLORIDE, AND CALCIUM CHLORIDE .6; .31; .03; .02 G/100ML; G/100ML; G/100ML; G/100ML
INJECTION, SOLUTION INTRAVENOUS CONTINUOUS PRN
Status: DISCONTINUED | OUTPATIENT
Start: 2022-11-16 | End: 2022-11-16 | Stop reason: HOSPADM

## 2022-11-16 RX ORDER — KETAMINE HYDROCHLORIDE 50 MG/ML
INJECTION, SOLUTION, CONCENTRATE INTRAMUSCULAR; INTRAVENOUS PRN
Status: DISCONTINUED | OUTPATIENT
Start: 2022-11-16 | End: 2022-11-16 | Stop reason: SDUPTHER

## 2022-11-16 RX ADMIN — TRANEXAMIC ACID 1000 MG: 1 INJECTION, SOLUTION INTRAVENOUS at 06:00

## 2022-11-16 RX ADMIN — SODIUM CHLORIDE, SODIUM LACTATE, POTASSIUM CHLORIDE, AND CALCIUM CHLORIDE: 600; 310; 30; 20 INJECTION, SOLUTION INTRAVENOUS at 09:23

## 2022-11-16 RX ADMIN — OXYCODONE 5 MG: 5 TABLET ORAL at 10:20

## 2022-11-16 RX ADMIN — PROCHLORPERAZINE EDISYLATE 5 MG: 5 INJECTION INTRAMUSCULAR; INTRAVENOUS at 09:58

## 2022-11-16 RX ADMIN — DEXAMETHASONE SODIUM PHOSPHATE 4 MG: 4 INJECTION, SOLUTION INTRAMUSCULAR; INTRAVENOUS at 07:35

## 2022-11-16 RX ADMIN — HEPARIN SODIUM 5000 UNITS: 5000 INJECTION INTRAVENOUS; SUBCUTANEOUS at 06:02

## 2022-11-16 RX ADMIN — PROPOFOL 50 MG: 10 INJECTION, EMULSION INTRAVENOUS at 09:14

## 2022-11-16 RX ADMIN — KETAMINE HYDROCHLORIDE 20 MG: 50 INJECTION, SOLUTION INTRAMUSCULAR; INTRAVENOUS at 07:20

## 2022-11-16 RX ADMIN — KETAMINE HYDROCHLORIDE 20 MG: 50 INJECTION, SOLUTION INTRAMUSCULAR; INTRAVENOUS at 08:09

## 2022-11-16 RX ADMIN — Medication 2000 MG: at 06:59

## 2022-11-16 RX ADMIN — ACETAMINOPHEN 1000 MG: 500 TABLET ORAL at 06:02

## 2022-11-16 RX ADMIN — ROCURONIUM BROMIDE 10 MG: 50 INJECTION, SOLUTION INTRAVENOUS at 08:32

## 2022-11-16 RX ADMIN — GLYCOPYRROLATE 0.3 MG: 0.2 INJECTION, SOLUTION INTRAMUSCULAR; INTRAVENOUS at 09:23

## 2022-11-16 RX ADMIN — FENTANYL CITRATE 100 MCG: 50 INJECTION, SOLUTION INTRAMUSCULAR; INTRAVENOUS at 07:10

## 2022-11-16 RX ADMIN — PROPOFOL 200 MG: 10 INJECTION, EMULSION INTRAVENOUS at 07:10

## 2022-11-16 RX ADMIN — ONDANSETRON 4 MG: 2 INJECTION INTRAMUSCULAR; INTRAVENOUS at 09:23

## 2022-11-16 RX ADMIN — APREPITANT 40 MG: 40 CAPSULE ORAL at 06:02

## 2022-11-16 RX ADMIN — SODIUM CHLORIDE, SODIUM LACTATE, POTASSIUM CHLORIDE, AND CALCIUM CHLORIDE: 600; 310; 30; 20 INJECTION, SOLUTION INTRAVENOUS at 05:45

## 2022-11-16 RX ADMIN — LIDOCAINE HYDROCHLORIDE 60 MG: 20 INJECTION, SOLUTION EPIDURAL; INFILTRATION; INTRACAUDAL; PERINEURAL at 07:10

## 2022-11-16 RX ADMIN — Medication 3 MG: at 09:23

## 2022-11-16 RX ADMIN — ROCURONIUM BROMIDE 50 MG: 50 INJECTION, SOLUTION INTRAVENOUS at 07:11

## 2022-11-16 ASSESSMENT — PAIN DESCRIPTION - DESCRIPTORS: DESCRIPTORS: ACHING

## 2022-11-16 ASSESSMENT — PAIN SCALES - GENERAL: PAINLEVEL_OUTOF10: 6

## 2022-11-16 ASSESSMENT — PAIN DESCRIPTION - LOCATION: LOCATION: ABDOMEN

## 2022-11-16 ASSESSMENT — PAIN - FUNCTIONAL ASSESSMENT: PAIN_FUNCTIONAL_ASSESSMENT: 0-10

## 2022-11-16 NOTE — OR NURSING
ABRAM King RN UPDATED PATIENT'S SUPPORT PERSON, [address], VIA PHONE CALL AT [phone].

## 2022-11-16 NOTE — ANESTHESIA POSTPROCEDURE EVALUATION
Department of Anesthesiology  Postprocedure Note    Patient: Dwayne Hermosillo  MRN: [id number]  YOB: 1985  Date of evaluation: 11/16/2022      Procedure Summary     Date: 11/16/22 Room / Location: [facility] OP OR 05 / [facility]    Anesthesia Start: 0700 Anesthesia Stop: 5009    Procedure: LIPOSUCTION OF TRUNK/COSMETIC (Abdomen) Diagnosis:       Encounter for cosmetic procedure      (Encounter for cosmetic procedure [Z41.1])    Surgeons: Caity Phepls MD Responsible Provider: Rebel Gamboa MD    Anesthesia Type: general ASA Status: 2          Anesthesia Type: No value filed. Dave Phase I: Dave Score: 10    Dave Phase II: Dave Score: 10      Anesthesia Post Evaluation    Patient location during evaluation: PACU  Patient participation: complete - patient participated  Level of consciousness: awake and alert  Airway patency: patent  Nausea: well controlled. Complications: no  Cardiovascular status: acceptable.   Respiratory status: acceptable  Hydration status: stable

## 2022-11-16 NOTE — OR NURSING
ABRAM King RN UPDATED PATIENT'S SUPPORT PERSON, [address], VIA PHONE Jelena AT [phone].

## 2022-11-16 NOTE — ANESTHESIA PRE PROCEDURE
Department of Anesthesiology  Preprocedure Note       Name:  Atanacio Mortimer   Age:  30 y.o.  :  1985                                          MRN:  [id number]         Date:  2022      Surgeon: Madeleine Burns):  Malorie Tejeda MD    Procedure: Procedure(s):  LIPOSUCTION OF TRUNK/COSMETIC    Medications prior to admission:   Prior to Admission medications    Medication Sig Start Date End Date Taking?  Authorizing Provider   Multiple Vitamins-Minerals (CENTRUM ADULTS PO) Take 1 tablet by mouth daily   Yes Historical Provider, MD   acetaminophen (TYLENOL) 500 MG tablet Take 1,000 mg by mouth in the morning and at bedtime   Yes Historical Provider, MD   ferrous sulfate (FE TABS 325) 325 (65 Fe) MG EC tablet Take 325 mg by mouth daily (with breakfast) 3/19/21  Yes Historical Provider, MD   sertraline (ZOLOFT) 100 MG tablet Take 100 mg by mouth nightly    Historical Provider, MD       Current medications:    Current Facility-Administered Medications   Medication Dose Route Frequency Provider Last Rate Last Admin    lidocaine 1 % injection 1 mL  1 mL IntraDERmal Once PRN Nehal Fermin MD        lactated ringers infusion   IntraVENous Continuous Nehal Fermin  mL/hr at 22 0545 New Bag at 22 0545    sodium chloride flush 0.9 % injection 5-40 mL  5-40 mL IntraVENous 2 times per day Nehal Fermin MD        sodium chloride flush 0.9 % injection 5-40 mL  5-40 mL IntraVENous PRN Nehal Fermin MD        0.9 % sodium chloride infusion   IntraVENous PRN Nehal Fermin MD        midazolam PF (VERSED) injection 2 mg  2 mg IntraVENous Once PRN Nehal Fermin MD        scopolamine (TRANSDERM-SCOP) transdermal patch 1 patch  1 patch TransDERmal Once Malorie Tejeda MD   1 patch at 22 0602    ceFAZolin (ANCEF) 2000 mg in sterile water 20 mL IV syringe  2,000 mg IntraVENous Once Malorie Tejeda MD           Allergies:  No Known Allergies    Problem List:    Patient Active Problem List Diagnosis Code    Slow transit constipation K59.01    Threatened  O20.0       Past Medical History:        Diagnosis Date    Abnormal Pap smear of cervix 2018    HPV+    Anemia     ORAL FE, DENIES HX OF BLOOD TRANSFUSIONS    Anxiety     ASCUS with positive high risk HPV cervical 2018    Preeclampsia     with first pregnancy    Vertigo        Past Surgical History:        Procedure Laterality Date     SECTION      x1    COLONOSCOPY  2022    COLPOSCOPY  2015    With Bx- LGSIL    DILATION AND CURETTAGE OF UTERUS  2020       Social History:    Social History     Tobacco Use    Smoking status: Never    Smokeless tobacco: Never   Substance Use Topics    Alcohol use: Yes     Alcohol/week: 4.0 standard drinks     Types: 4 Glasses of wine per week                                Counseling given: Not Answered      Vital Signs (Current):   Vitals:    22 1047 22 0536   BP:  (!) 145/84   Pulse:  97   Resp:  18   Temp:  99 °F (37.2 °C)   TempSrc:  Oral   SpO2:  98%   Weight: 215 lb (97.5 kg) 215 lb (97.5 kg)   Height: 5' 6\" (1.676 m)                                               BP Readings from Last 3 Encounters:   22 (!) 145/84   22 118/68       NPO Status: Time of last liquid consumption:                         Time of last solid consumption:                         Date of last liquid consumption: 11/15/22                        Date of last solid food consumption: 11/15/22    BMI:   Wt Readings from Last 3 Encounters:   22 215 lb (97.5 kg)   22 214 lb 3.2 oz (97.2 kg)     Body mass index is 34.7 kg/m².     CBC:   Lab Results   Component Value Date/Time    HGB 13.0 2022 02:57 PM       CMP:   Lab Results   Component Value Date/Time     2022 05:53 AM    K 4.8 2022 05:53 AM     2022 05:53 AM    CO2 24 2022 05:53 AM    BUN 10 2022 05:53 AM    CREATININE 0.90 2022 05:53 AM LABGLOM >60 11/16/2022 05:53 AM    GLUCOSE 94 11/16/2022 05:53 AM    CALCIUM 8.9 11/16/2022 05:53 AM       POC Tests: No results for input(s): POCGLU, POCNA, POCK, POCCL, POCBUN, POCHEMO, POCHCT in the last 72 hours. Coags: No results found for: PROTIME, INR, APTT    HCG (If Applicable):   Lab Results   Component Value Date    PREGTESTUR Negative 11/16/2022        ABGs: No results found for: PHART, PO2ART, FRX0RSL, MNU4OAV, BEART, W0PRLKXA     Type & Screen (If Applicable):  No results found for: LABABO, LABRH    Drug/Infectious Status (If Applicable):  Lab Results   Component Value Date/Time    HEPCAB NONREACTIVE 09/07/2022 02:13 PM       COVID-19 Screening (If Applicable):   Lab Results   Component Value Date/Time    COVID19 Not Detected 05/22/2020 01:26 PM           Anesthesia Evaluation  Patient summary reviewed and Nursing notes reviewed no history of anesthetic complications:   Airway: Mallampati: II  TM distance: >3 FB   Neck ROM: full  Mouth opening: > = 3 FB   Dental:      Comment: Fillings    Pulmonary:normal exam                               Cardiovascular:                      Neuro/Psych:   (+) depression/anxiety              ROS comment: ADHD    Vertigo GI/Hepatic/Renal:            ROS comment: Obesity. Endo/Other:    (+) blood dyscrasia: anemia:., .                 Abdominal:             Vascular: Other Findings:           Anesthesia Plan      general     ASA 2     (GA)  Induction: intravenous. MIPS: Postoperative opioids intended and Prophylactic antiemetics administered. Anesthetic plan and risks discussed with patient.                         Lashaun Hendrickson MD   11/16/2022

## 2022-11-16 NOTE — DISCHARGE INSTRUCTIONS
Liposuction: What to Expect at [address] [place] [address]  After liposuction, the area will be wrapped to help reduce swelling, bruising, and pain. The wrap may be a compression garment or elastic bandages. You may have to keep this wrap on for 3 to 4 weeks. If fat was removed from your calves or ankles, you may need to wear support hose for about 6 weeks. Fluid may drain from the cuts (incisions) for several days. The fluid will be bloody at first, but will turn clear in a few days. The area will probably be bruised and swollen for at least 10 to 14 days. You will be able to return to your normal activities as soon as you feel comfortable. This may take several days to a few weeks. Most people can return to light work within a few days. It may take longer to get back to normal if a lot of fat was removed. How can you care for yourself at home? Activity    Rest when you feel tired. Getting enough sleep will help you recover. Try to walk each day. Start by walking a little more than you did the day before. Bit by bit, increase the amount you walk. Walking boosts blood flow and helps prevent pneumonia and constipation. You will probably be able to return to work within a few days. Avoid strenuous activities, such as bicycle riding, jogging, weight lifting, or aerobic exercise, until your doctor says it is okay. This may be in 2 to 3 weeks. Diet    You can eat your normal diet. If your stomach is upset, try bland, low-fat foods like plain rice, broiled chicken, toast, and yogurt. Drink plenty of fluids (unless your doctor tells you not to). Medicines    Your doctor will tell you if and when you can restart your medicines. Your doctor will also give you instructions about taking any new medicines. If you stopped taking aspirin or some other blood thinner, your doctor will tell you when to start taking it again. If your doctor prescribed antibiotics, take them as directed.  Do not stop taking them just because you feel better. You need to take the full course of antibiotics. Take pain medicines exactly as directed. If the doctor gave you a prescription medicine for pain, take it as prescribed. If you are not taking a prescription pain medicine, ask your doctor if you can take an over-the-counter medicine. Incision care    You may have bandages under your compression garment. Your doctor will tell you when to remove the bandages and just use the compression garment. You may use light gauze under the compression garment if the cuts are draining a lot. Hygiene issues    You may shower 24 to 48 hours after surgery, if your doctor okays it. You may remove the compression wraps when you shower. Pat the cuts (incisions) dry. Do not take a bath for the first 2 weeks, or until your doctor tells you it is okay. Follow-up care is a key part of your treatment and safety. Be sure to make and go to all appointments, and call your doctor if you are having problems. It's also a good idea to know your test results and keep a list of the medicines you take. When should you call for help? Call 911 anytime you think you may need emergency care. For example, call if:  You passed out (lost consciousness). You have severe trouble breathing. You have symptoms of a blood clot in your lung (called a pulmonary embolism). These may include:  Sudden chest pain. Trouble breathing. Coughing up blood. Call your doctor now or seek immediate medical care if:  You have shortness of breath. You have new belly pain. You have signs of infection, such as: Increased pain, swelling, warmth, or redness. Red streaks leading from the incision. Pus draining from the incision. A fever. You have signs of a blood clot, such as:  Pain in your calf, back of the knee, thigh, or groin. Redness and swelling in your leg or groin.   You have continued bleeding that soaks a bandage after holding firm pressure for 10 minutes. Watch closely for changes in your health, and be sure to contact your doctor if you have any problems. MEDICATION INTERACTION:   During your procedure you potentially received a medication or medications which may reduce the effectiveness of oral contraceptives. Please consider other forms of contraception for 1 month following your procedure if you are currently using oral contraceptives as your primary form of birth control. In addition to this, we recommend continuing your oral contraceptive as prescribed, unless otherwise instructed by your physician, during this time. CALL YOUR DOCTOR IF   Excessive bleeding that does not stop after holding pressure over the area. Temperature of 101 degrees F or above. Excessive redness, swelling or bruising, and/or green or yellow, smelly discharge from incision. After general anesthesia or intravenous sedation, for 24 hours or while taking prescription Narcotics:   Limit your activities   A responsible adult needs to be with you for the next 24 hours   Do not drive and operate hazardous machinery   Do not make important personal or business decisions   Do not drink alcoholic beverages     If you have not urinated within 8 hours after discharge, and you are experiencing discomfort from urinary retention, please go to the nearest ED. If you have sleep apnea and have a CPAP machine, please use it for all naps and sleeping. Please use caution when taking narcotics and any of your home medications that may cause drowsiness. * Please give a list of your current medications to your Primary Care Provider. * Please update this list whenever your medications are discontinued, doses are   changed, or new medications (including over-the-counter products) are added. * Please carry medication information at all times in case of emergency situations.      These are general instructions for a healthy lifestyle:   No smoking/ No tobacco products/ Avoid exposure to second hand smoke   Surgeon General's Warning: Quitting smoking now greatly reduces serious risk to your health. Obesity, smoking, and sedentary lifestyle greatly increases your risk for illness   A healthy diet, regular physical exercise & weight monitoring are important for maintaining a healthy lifestyle   You may be retaining fluid if you have a history of heart failure or if you experience any of the following symptoms: Weight gain of 3 pounds or more overnight or 5 pounds in a week, increased swelling in our hands or feet or shortness of breath while lying flat in bed. Please call your doctor as soon as you notice any of these symptoms; do not wait until your next office visit.

## 2022-11-16 NOTE — OP NOTE
Patient: Rodo Alba  YOB: 1985  MRN: [id number]    Date of Procedure: 11/16/2022    PREOPERATIVE DIAGNOSIS:  Lipodystrophy of trunk     POSTOPERATIVE DIAGNOSIS:  Lipodystrophy of trunk     PROCEDURE PERFORMED:  Tumescent-assisted liposuction of trunk     SURGEON:  Giovani Banuelos MD     ASSISTANT:  Please see RN note. ANESTHESIA:  GETA     COMPLICATIONS:  None     SPECIMENS REMOVED:  None     IMPLANTS:  None     ESTIMATED BLOOD LOSS:  <125 cc     INDICATIONS:  This is a very pleasant 80-year-old white woman who presents for operative intervention. She desires contour improvement via tumescent-assisted liposuction. Safety is my primary concern, but we can expect 5000 mL of fat aspirate today. She understands all these and desires to proceed. Risks included but are not limited to infection, bleeding, asymmetry, wound healing problems, contour irregularity, incomplete resolution of symptoms, they understand. Again, they understand this is a multistep process and not a single procedure resolution of her issues. She understands all this and desires to proceed. FINDINGS:  5000 mL of fat aspirate total was removed. PROCEDURE:  After being marked preoperatively and brought to the OR, successful GETA was obtained. The areas were prepped and draped in a sterile fashion in the supine position. Tumescent fluid total of 4 liters were used. This consisted of warmed lactated Ringer's with 1 mL of epinephrine and 10 mL of 1% plain lidocaine per liter. A total of 4 liters were used. This was instilled into the subcutaneous tissue after a proper time-out procedure in the anterior and lateral abdominal wall. Of note, SCDs, pillow under the knees and padding of all pressure points were put into place. A 6-mm incision at the 12 o'clock position of the umbilicus, the lateral pubic area on either side made with a 15-blade scalpel.   Tumescent fluid was instilled throughout the anterior lateral abdominal subcutaneous fat and pubic area in the usual fashion. This was allowed to set up properly. A 6 and a 5 and a 4-mm Mercedes III blunt tip cannulas were used to suction away the redundant fat. Great care was taken to stay in the deep and intermediate planes to minimize contour irregularities as much as seemingly possible and to remove subjectively appropriate and objectively equal amounts of fat on either side. Visualization and palpation was used to determine the endpoint between management on the left and right sides. Focus on the upper abdominal wall lateral in a \"windshield wiper\" pattern was one focus, but the lateral hips and flanks above the hips on the anterolateral aspect of the abdominal wall were also addressed. A 4-mm cannula then was used to suction away redundancy of the pubis to feather out the redundancy here and give a nice contour to this area for her. The total operative time from incision to closure was 70 minutes after dressing application included in this time of the actual surgical time. Sterile dressing consisting of 4x4s, ABD pads, Kerlix roll x4, and a Medipore tape to secure this was applied over which a 3X abdominal garment was applied. Because the patient is tall, an abdominal binder Velcro corset was placed in the upper abdominal wall and applied. The patient was reversed from anesthesia and left the OR in stable condition with no complications.         Hussain Mccabe MD

## 2022-11-16 NOTE — ANESTHESIA PROCEDURE NOTES
Airway  Date/Time: 11/16/2022 7:13 AM  Urgency: elective    Airway not difficult    General Information and Staff    Patient location during procedure: OR  Resident/CRNA: ALLEN Quinones CRNA  Performed: resident/CRNA     Indications and Patient Condition  Indications for airway management: anesthesia  Spontaneous Ventilation: absent  Sedation level: deep  Preoxygenated: yes  Patient position: sniffing  MILS maintained throughout  Mask difficulty assessment: vent by bag mask    Final Airway Details  Final airway type: endotracheal airway      Successful airway: ETT  Cuffed: yes   Facilitating devices/methods: intubating stylet  Endotracheal tube insertion site: oral  Blade: Pili  Blade size: #4  ETT size (mm): 7.0  Placement verified by: chest auscultation and capnometry   Measured from: lips  ETT to lips (cm): 21  Number of attempts at approach: 1  Number of other approaches attempted: 0

## 2022-11-28 ENCOUNTER — PREP FOR PROCEDURE (OUTPATIENT)
Dept: SURGERY | Age: 37
End: 2022-11-28

## 2023-10-19 ENCOUNTER — HOSPITAL ENCOUNTER (EMERGENCY)
Age: 38
Discharge: HOME | End: 2023-10-19
Attending: EMERGENCY MEDICINE
Payer: COMMERCIAL

## 2023-10-19 VITALS
HEIGHT: 66 IN | DIASTOLIC BLOOD PRESSURE: 81 MMHG | SYSTOLIC BLOOD PRESSURE: 147 MMHG | HEART RATE: 94 BPM | BODY MASS INDEX: 33.75 KG/M2 | WEIGHT: 210 LBS | RESPIRATION RATE: 15 BRPM | TEMPERATURE: 99.1 F | OXYGEN SATURATION: 99 %

## 2023-10-19 DIAGNOSIS — O20.0 THREATENED MISCARRIAGE IN EARLY PREGNANCY: Primary | ICD-10-CM

## 2023-10-19 LAB
ANION GAP SERPL CALC-SCNC: 10 MMOL/L (ref 2–11)
APPEARANCE UR: ABNORMAL
BACTERIA URNS QL MICRO: ABNORMAL /HPF
BILIRUB UR QL: NEGATIVE
BUN SERPL-MCNC: 10 MG/DL (ref 6–23)
CALCIUM SERPL-MCNC: 8.9 MG/DL (ref 8.3–10.4)
CASTS URNS QL MICRO: 0 /LPF
CHLORIDE SERPL-SCNC: 104 MMOL/L (ref 98–107)
CO2 SERPL-SCNC: 22 MMOL/L (ref 21–32)
COLOR UR: ABNORMAL
CREAT SERPL-MCNC: 0.69 MG/DL (ref 0.6–1)
CRYSTALS URNS QL MICRO: 0 /LPF
EPI CELLS #/AREA URNS HPF: ABNORMAL /HPF
ERYTHROCYTE [DISTWIDTH] IN BLOOD BY AUTOMATED COUNT: 16.2 % (ref 11.9–14.6)
GLUCOSE SERPL-MCNC: 96 MG/DL (ref 65–100)
GLUCOSE UR STRIP.AUTO-MCNC: NEGATIVE MG/DL
HCG SERPL-ACNC: 284 MIU/ML (ref 0–6)
HCG UR QL: POSITIVE
HCT VFR BLD AUTO: 40.2 % (ref 35.8–46.3)
HGB BLD-MCNC: 12.8 G/DL (ref 11.7–15.4)
HGB UR QL STRIP: ABNORMAL
KETONES UR QL STRIP.AUTO: NEGATIVE MG/DL
LEUKOCYTE ESTERASE UR QL STRIP.AUTO: NEGATIVE
MCH RBC QN AUTO: 26.2 PG (ref 26.1–32.9)
MCHC RBC AUTO-ENTMCNC: 31.8 G/DL (ref 31.4–35)
MCV RBC AUTO: 82.4 FL (ref 82–102)
MUCOUS THREADS URNS QL MICRO: 0 /LPF
NITRITE UR QL STRIP.AUTO: NEGATIVE
NRBC # BLD: 0 K/UL (ref 0–0.2)
OTHER OBSERVATIONS: ABNORMAL
PH UR STRIP: 6 (ref 5–9)
PLATELET # BLD AUTO: 364 K/UL (ref 150–450)
PMV BLD AUTO: 9.1 FL (ref 9.4–12.3)
POTASSIUM SERPL-SCNC: 4 MMOL/L (ref 3.5–5.1)
PROT UR STRIP-MCNC: 30 MG/DL
RBC # BLD AUTO: 4.88 M/UL (ref 4.05–5.2)
RBC #/AREA URNS HPF: ABNORMAL /HPF
SODIUM SERPL-SCNC: 136 MMOL/L (ref 133–143)
SP GR UR REFRACTOMETRY: >=1.03 (ref 1–1.02)
UROBILINOGEN UR QL STRIP.AUTO: 0.2 EU/DL (ref 0.2–1)
WBC # BLD AUTO: 7.3 K/UL (ref 4.3–11.1)
WBC URNS QL MICRO: ABNORMAL /HPF

## 2023-10-19 PROCEDURE — 81001 URINALYSIS AUTO W/SCOPE: CPT

## 2023-10-19 PROCEDURE — 99283 EMERGENCY DEPT VISIT LOW MDM: CPT

## 2023-10-19 PROCEDURE — 84702 CHORIONIC GONADOTROPIN TEST: CPT

## 2023-10-19 PROCEDURE — 85027 COMPLETE CBC AUTOMATED: CPT

## 2023-10-19 PROCEDURE — 80048 BASIC METABOLIC PNL TOTAL CA: CPT

## 2023-10-19 PROCEDURE — 81025 URINE PREGNANCY TEST: CPT

## 2023-10-19 ASSESSMENT — PAIN SCALES - GENERAL: PAINLEVEL_OUTOF10: 5

## 2023-10-19 ASSESSMENT — ENCOUNTER SYMPTOMS
PHOTOPHOBIA: 0
COUGH: 0
CHEST TIGHTNESS: 0
VOICE CHANGE: 0
COLOR CHANGE: 0
BACK PAIN: 0
VOMITING: 0
ABDOMINAL PAIN: 0
NAUSEA: 0

## 2023-10-19 ASSESSMENT — LIFESTYLE VARIABLES
HOW MANY STANDARD DRINKS CONTAINING ALCOHOL DO YOU HAVE ON A TYPICAL DAY: PATIENT DOES NOT DRINK
HOW OFTEN DO YOU HAVE A DRINK CONTAINING ALCOHOL: NEVER

## 2023-10-19 ASSESSMENT — PAIN - FUNCTIONAL ASSESSMENT: PAIN_FUNCTIONAL_ASSESSMENT: 0-10

## 2023-10-19 ASSESSMENT — PAIN DESCRIPTION - DESCRIPTORS: DESCRIPTORS: CRAMPING

## 2023-10-19 ASSESSMENT — PAIN DESCRIPTION - LOCATION: LOCATION: ABDOMEN

## 2023-10-19 NOTE — ED TRIAGE NOTES
Pt ambulatory to triage with c/o vaginal bleeding and cramping. Pt reports being approx 4 weeks pregnant. Pt reports hx of miscarriage.

## 2023-10-19 NOTE — ED PROVIDER NOTES
Emergency Department Provider Note       PCP: ALLEN Brooks NP   Age: 41 y.o. Sex: female     DISPOSITION Decision To Discharge 10/19/2023 05:56:01 PM       ICD-10-CM    1. Threatened miscarriage in early pregnancy  O20.0           Medical Decision Making     Complexity of Problems Addressed:  1 or more acute illnesses that pose a threat to life or bodily function. Data Reviewed and Analyzed:  I independently ordered and reviewed each unique test.  I reviewed external records: ED visit note from an outside group. I reviewed external records: provider visit note from outside specialist.  I reviewed external records: previous lab results from outside ED. Discussion of management or test interpretation. Patient is well-appearing here with a nontender abdomen on exam.  Will obtain labs to include hCG quantitative. Review of records show patient's blood type is O+.    5:57 PM EDT  Laboratory data showed a hemoglobin that is stable. Patient's quantitative is 284. Blood type is O+    Discussed this with patient. Plan to discharge. She has a follow-up scheduled with OB/GYN on Monday. Will need a repeat hCG quantitative. Given threatened miscarriage precautions. Given immediate return to the ER precautions. Risk of Complications and/or Morbidity of Patient Management:  Shared medical decision making was utilized in creating the patients health plan today. History       Patient presents the ER with complaints of vaginal bleeding. Patient states she believes she is about 4 weeks pregnant. Earlier today started with some lower abdominal cramping and some vaginal bleeding. Denies any vaginal discharge. Denies any fevers or abdominal pain currently. Denies any nausea. The history is provided by the patient.    Vaginal Bleeding  Quality:  Bright red  Severity:  Mild  Onset quality:  Gradual  Duration:  3 hours  Progression:  Worsening  Chronicity:  New  Possible

## 2023-10-19 NOTE — DISCHARGE INSTRUCTIONS
Limit any strenuous activity  Follow-up with OB/GYN for repeat laboratory testing on Monday  Return to the ER for any new, worsening or life-threatening symptoms to include persistent bleeding, lightheadedness, worsening pain or fatigue

## 2023-10-23 ENCOUNTER — OFFICE VISIT (OUTPATIENT)
Dept: OBGYN CLINIC | Age: 38
End: 2023-10-23
Payer: COMMERCIAL

## 2023-10-23 VITALS
DIASTOLIC BLOOD PRESSURE: 76 MMHG | SYSTOLIC BLOOD PRESSURE: 122 MMHG | HEIGHT: 66 IN | BODY MASS INDEX: 34.55 KG/M2 | WEIGHT: 215 LBS

## 2023-10-23 DIAGNOSIS — O20.0 THREATENED ABORTION: Primary | ICD-10-CM

## 2023-10-23 LAB — HCG SERPL-ACNC: 1497 MIU/ML (ref 0–6)

## 2023-10-23 PROCEDURE — 99213 OFFICE O/P EST LOW 20 MIN: CPT | Performed by: NURSE PRACTITIONER

## 2023-10-23 NOTE — PROGRESS NOTES
Standing Status:   Future     Number of Occurrences:   1     Standing Expiration Date:   10/23/2024    HCG, Quantitative, Pregnancy     Standing Status:   Future     Standing Expiration Date:   10/23/2024           Supervising physician is Dr. Mavis De La Rosa. Greater than 50% of the 30 minute visit were spent in counseling to the above topics.

## 2023-10-25 ENCOUNTER — NURSE ONLY (OUTPATIENT)
Dept: OBGYN CLINIC | Age: 38
End: 2023-10-25

## 2023-10-25 DIAGNOSIS — O20.0 THREATENED ABORTION: ICD-10-CM

## 2023-10-25 LAB — HCG SERPL-ACNC: 2664 MIU/ML (ref 0–6)

## 2023-10-26 ENCOUNTER — TELEPHONE (OUTPATIENT)
Dept: OBGYN CLINIC | Age: 38
End: 2023-10-26

## 2023-10-26 NOTE — TELEPHONE ENCOUNTER
----- Message from [facility], APRN - CNP sent at 10/26/2023  7:31 AM EDT -----  Good rise

## 2023-11-13 ENCOUNTER — PATIENT MESSAGE (OUTPATIENT)
Dept: OBGYN CLINIC | Age: 38
End: 2023-11-13

## 2023-11-13 RX ORDER — SERTRALINE HYDROCHLORIDE 100 MG/1
100 TABLET, FILM COATED ORAL NIGHTLY
Qty: 30 TABLET | Refills: 9 | Status: SHIPPED | OUTPATIENT
Start: 2023-11-13

## 2023-11-13 NOTE — TELEPHONE ENCOUNTER
From: Jose Iqbal  Sent: 11/13/2023 11:47 AM EST  To: Anselmo Wu  Subject: RE: Silvia Merida to refill zoloft  ----- Message -----  From: Sara Guajardo  Sent: 11/13/2023 11:42 AM EST  To: ALLEN Maynard - CNP  Subject: FW: Zoloft     Can we refill zoloft?  ----- Message -----  From: Clay Fontaine RN  Sent: 11/13/2023 11:28 AM EST  To: Anselmo Wu  Subject: FW: Zoloft       ----- Message -----  From: Jose Iqbal  Sent: 11/13/2023 11:27 AM EST  To: *  Subject: Zoloft     Yes I take it nightly. [facility] [address] [facility] [id number].      Thanks you

## 2023-11-27 ENCOUNTER — INITIAL PRENATAL (OUTPATIENT)
Dept: OBGYN CLINIC | Age: 38
End: 2023-11-27
Payer: COMMERCIAL

## 2023-11-27 VITALS — BODY MASS INDEX: 35.87 KG/M2 | HEIGHT: 66 IN | WEIGHT: 223.2 LBS

## 2023-11-27 DIAGNOSIS — Z87.42 HISTORY OF ABNORMAL CERVICAL PAP SMEAR: ICD-10-CM

## 2023-11-27 DIAGNOSIS — O36.80X0 ENCOUNTER TO DETERMINE FETAL VIABILITY OF PREGNANCY, SINGLE OR UNSPECIFIED FETUS: ICD-10-CM

## 2023-11-27 DIAGNOSIS — O09.299 HISTORY OF PRE-ECLAMPSIA IN PRIOR PREGNANCY, CURRENTLY PREGNANT: ICD-10-CM

## 2023-11-27 DIAGNOSIS — F32.A ANXIETY AND DEPRESSION: ICD-10-CM

## 2023-11-27 DIAGNOSIS — O99.210 OBESITY IN PREGNANCY: ICD-10-CM

## 2023-11-27 DIAGNOSIS — O09.899 HISTORY OF PRETERM DELIVERY, CURRENTLY PREGNANT: ICD-10-CM

## 2023-11-27 DIAGNOSIS — Z98.891 HISTORY OF C-SECTION: ICD-10-CM

## 2023-11-27 DIAGNOSIS — F41.9 ANXIETY AND DEPRESSION: ICD-10-CM

## 2023-11-27 DIAGNOSIS — Z3A.09 9 WEEKS GESTATION OF PREGNANCY: ICD-10-CM

## 2023-11-27 DIAGNOSIS — O09.521 MULTIGRAVIDA OF ADVANCED MATERNAL AGE IN FIRST TRIMESTER: Primary | ICD-10-CM

## 2023-11-27 PROBLEM — O20.0 THREATENED ABORTION: Status: RESOLVED | Noted: 2020-05-13 | Resolved: 2023-11-27

## 2023-11-27 PROBLEM — O09.529 AMA (ADVANCED MATERNAL AGE) MULTIGRAVIDA 35+: Status: ACTIVE | Noted: 2023-11-27

## 2023-11-27 PROBLEM — K59.01 SLOW TRANSIT CONSTIPATION: Status: RESOLVED | Noted: 2019-04-03 | Resolved: 2023-11-27

## 2023-11-27 LAB
ABO + RH BLD: NORMAL
ALBUMIN SERPL-MCNC: 3.6 G/DL (ref 3.5–5)
ALBUMIN/GLOB SERPL: 0.9 (ref 0.4–1.6)
ALP SERPL-CCNC: 89 U/L (ref 50–136)
ALT SERPL-CCNC: 15 U/L (ref 12–65)
ANION GAP SERPL CALC-SCNC: 9 MMOL/L (ref 2–11)
AST SERPL-CCNC: 10 U/L (ref 15–37)
BASOPHILS # BLD: 0.1 K/UL (ref 0–0.2)
BASOPHILS NFR BLD: 1 % (ref 0–2)
BILIRUB SERPL-MCNC: 0.1 MG/DL (ref 0.2–1.1)
BLOOD GROUP ANTIBODIES SERPL: NORMAL
BUN SERPL-MCNC: 7 MG/DL (ref 6–23)
CALCIUM SERPL-MCNC: 10.2 MG/DL (ref 8.3–10.4)
CHLORIDE SERPL-SCNC: 104 MMOL/L (ref 101–110)
CO2 SERPL-SCNC: 25 MMOL/L (ref 21–32)
CREAT SERPL-MCNC: 0.7 MG/DL (ref 0.6–1)
CREAT UR-MCNC: 178 MG/DL
DIFFERENTIAL METHOD BLD: ABNORMAL
EOSINOPHIL # BLD: 0.1 K/UL (ref 0–0.8)
EOSINOPHIL NFR BLD: 1 % (ref 0.5–7.8)
ERYTHROCYTE [DISTWIDTH] IN BLOOD BY AUTOMATED COUNT: 15.6 % (ref 11.9–14.6)
EST. AVERAGE GLUCOSE BLD GHB EST-MCNC: 114 MG/DL
GLOBULIN SER CALC-MCNC: 4.1 G/DL (ref 2.8–4.5)
GLUCOSE SERPL-MCNC: 89 MG/DL (ref 65–100)
HBA1C MFR BLD: 5.6 % (ref 4.8–5.6)
HBV SURFACE AG SER QL: NONREACTIVE
HCT VFR BLD AUTO: 38.7 % (ref 35.8–46.3)
HCV AB SER QL: NONREACTIVE
HGB BLD-MCNC: 12 G/DL (ref 11.7–15.4)
HIV 1+2 AB+HIV1 P24 AG SERPL QL IA: NONREACTIVE
HIV 1/2 RESULT COMMENT: NORMAL
IMM GRANULOCYTES # BLD AUTO: 0 K/UL (ref 0–0.5)
IMM GRANULOCYTES NFR BLD AUTO: 0 % (ref 0–5)
LDH SERPL L TO P-CCNC: 164 U/L (ref 100–190)
LYMPHOCYTES # BLD: 2.7 K/UL (ref 0.5–4.6)
LYMPHOCYTES NFR BLD: 37 % (ref 13–44)
MCH RBC QN AUTO: 26.1 PG (ref 26.1–32.9)
MCHC RBC AUTO-ENTMCNC: 31 G/DL (ref 31.4–35)
MCV RBC AUTO: 84.3 FL (ref 82–102)
MONOCYTES # BLD: 0.5 K/UL (ref 0.1–1.3)
MONOCYTES NFR BLD: 7 % (ref 4–12)
NEUTS SEG # BLD: 3.9 K/UL (ref 1.7–8.2)
NEUTS SEG NFR BLD: 54 % (ref 43–78)
NRBC # BLD: 0 K/UL (ref 0–0.2)
PLATELET # BLD AUTO: 338 K/UL (ref 150–450)
PMV BLD AUTO: 9.5 FL (ref 9.4–12.3)
POTASSIUM SERPL-SCNC: 3.9 MMOL/L (ref 3.5–5.1)
PROT SERPL-MCNC: 7.7 G/DL (ref 6.3–8.2)
PROT UR-MCNC: 15 MG/DL
PROT/CREAT UR-RTO: 0.1
RBC # BLD AUTO: 4.59 M/UL (ref 4.05–5.2)
RUBV IGG SERPL IA-ACNC: 116.3 IU/ML
SODIUM SERPL-SCNC: 138 MMOL/L (ref 133–143)
URATE SERPL-MCNC: 3.8 MG/DL (ref 2.6–6)
WBC # BLD AUTO: 7.3 K/UL (ref 4.3–11.1)

## 2023-11-27 PROCEDURE — 76817 TRANSVAGINAL US OBSTETRIC: CPT | Performed by: OBSTETRICS & GYNECOLOGY

## 2023-11-27 NOTE — PROGRESS NOTES
Madelaine Rivers G3, P1 presents to the office today for NOB talk and ultrasound. EDC is 6/26/24 based off of LMP. Patient education was discussed including: nutrition, appropriate weight gain, diet, exercise, travel, hospital classes, breastfeeding/lactation services, flu vaccine, Tdap, glucola, GBS, and Corona Virus and Zika precautions. Genetic testing discussed in depth and patient elects NIPT. Patients past medical history is significant for AMA, hx PEC with G1, delivered by emergent C/S at 30 weeks. Baseline labs drawn today, advised ASA/Vit D at 12 weeks. She is to return to the office in 3 weeks for NOB exam. All questions answered and she voiced full understanding. She is encouraged to call the office with any questions or concerns.

## 2023-11-28 LAB
RPR SER QL: NONREACTIVE
VZV IGG SER IA-ACNC: 1911 INDEX

## 2023-11-29 LAB
HGB A MFR BLD: 97.5 % (ref 96.4–98.8)
HGB A2 MFR BLD COLUMN CHROM: 2.5 % (ref 1.8–3.2)
HGB F MFR BLD: 0 % (ref 0–2)
HGB FRACT BLD-IMP: NORMAL
HGB S MFR BLD: 0 %

## 2023-11-30 LAB
BACTERIA SPEC CULT: NORMAL
BACTERIA SPEC CULT: NORMAL
SERVICE CMNT-IMP: NORMAL

## 2023-12-13 ENCOUNTER — ROUTINE PRENATAL (OUTPATIENT)
Dept: OBGYN CLINIC | Age: 38
End: 2023-12-13
Payer: COMMERCIAL

## 2023-12-13 VITALS — DIASTOLIC BLOOD PRESSURE: 88 MMHG | WEIGHT: 221.6 LBS | SYSTOLIC BLOOD PRESSURE: 134 MMHG | BODY MASS INDEX: 35.77 KG/M2

## 2023-12-13 DIAGNOSIS — Z3A.12 12 WEEKS GESTATION OF PREGNANCY: ICD-10-CM

## 2023-12-13 DIAGNOSIS — O28.3 ABNORMAL ANTENATAL ULTRASOUND: Primary | ICD-10-CM

## 2023-12-13 DIAGNOSIS — R10.32 LLQ PAIN: ICD-10-CM

## 2023-12-13 DIAGNOSIS — O36.8390 ULTRASOUND SCAN DONE FOR INABILITY TO HEAR FETAL HEART TONES: ICD-10-CM

## 2023-12-13 DIAGNOSIS — N89.8 VAGINAL ODOR: Primary | ICD-10-CM

## 2023-12-13 DIAGNOSIS — Z13.89 SCREENING FOR GENITOURINARY CONDITION: ICD-10-CM

## 2023-12-13 DIAGNOSIS — R10.30 LOWER ABDOMINAL PAIN: ICD-10-CM

## 2023-12-13 DIAGNOSIS — Z36.82 ENCOUNTER FOR (NT) NUCHAL TRANSLUCENCY SCAN: ICD-10-CM

## 2023-12-13 LAB
BILIRUBIN, URINE, POC: NEGATIVE
BLOOD URINE, POC: NEGATIVE
GLUCOSE URINE, POC: NEGATIVE
GLUCOSE URINE, POC: NEGATIVE
KETONES, URINE, POC: NEGATIVE
LEUKOCYTE ESTERASE, URINE, POC: NEGATIVE
NITRITE, URINE, POC: NEGATIVE
PH, URINE, POC: 6 (ref 4.6–8)
PROTEIN,URINE, POC: 30
PROTEIN,URINE, POC: NORMAL
SPECIFIC GRAVITY, URINE, POC: 1.03 (ref 1–1.03)
URINALYSIS CLARITY, POC: CLEAR
URINALYSIS COLOR, POC: YELLOW
UROBILINOGEN, POC: NORMAL

## 2023-12-13 PROCEDURE — 99214 OFFICE O/P EST MOD 30 MIN: CPT | Performed by: NURSE PRACTITIONER

## 2023-12-13 PROCEDURE — 81002 URINALYSIS NONAUTO W/O SCOPE: CPT | Performed by: NURSE PRACTITIONER

## 2023-12-13 PROCEDURE — 76817 TRANSVAGINAL US OBSTETRIC: CPT | Performed by: NURSE PRACTITIONER

## 2023-12-13 NOTE — PROGRESS NOTES
Adiyta Huggins is a 41 y.o. [id number] at 12w0d weeks gestation who is seen for  sharp lower abdominal pain (now specifically LLQ), dark brown discharge, and vaginal odor . Onset was 2 weeks ago. Symptoms have been gradually worsening since. Denies itching. Denies burning/frequency with urination. Denies flank pain/hematuria. Denies new partners. Also notes has been having issues with  constipation and hemorrhoids. Denies VB, LOV, Cx. HISTORY:    OB History    Para Term  AB Living   3 1   1 1 1   SAB IAB Ectopic Molar Multiple Live Births   1         1      # Outcome Date GA Lbr Naun/2nd Weight Sex Delivery Anes PTL Lv   3 Current            2 SAB 2020           1  08 30w0d  1.162 kg (2 lb 9 oz) F CS-LTranv Spinal        Complications: Preeclampsia       Patient's last menstrual period was 2023 (within weeks). ROS:  Feeling well. No dyspnea or chest pain on exertion. No abdominal pain, change in bowel habits, black or bloody stools. No urinary tract symptoms. OB ROS: No chest pain, contractions, fever, headache , nausea and vomiting, pelvic pressure, right upper quadrant pain  , shortness of breath, swelling, vaginal bleeding , vaginal leaking of fluid , and visual disturbances. PHYSICAL EXAM:  Blood pressure 134/88, weight 100.5 kg (221 lb 9.6 oz), last menstrual period 2023. The patient appears well, alert, oriented x 3. Lungs are clear. Heart RRR, no murmurs. Pelvic exam: VULVA: normal appearing vulva with no masses, tenderness or lesions, VAGINA: normal appearing vagina with normal color and discharge, no lesions, CERVIX: normal appearing cervix without discharge or lesions. ASSESSMENT:  Encounter Diagnoses   Name Primary?     12 weeks gestation of pregnancy     Screening for genitourinary condition     Vaginal odor Yes    Ultrasound scan done for inability to hear fetal heart tones     Lower abdominal pain     LLQ pain        PLAN:  NuSwab

## 2023-12-14 ENCOUNTER — ROUTINE PRENATAL (OUTPATIENT)
Dept: OBGYN CLINIC | Age: 38
End: 2023-12-14
Payer: COMMERCIAL

## 2023-12-14 VITALS — SYSTOLIC BLOOD PRESSURE: 126 MMHG | DIASTOLIC BLOOD PRESSURE: 84 MMHG

## 2023-12-14 DIAGNOSIS — O99.210 OBESITY IN PREGNANCY: ICD-10-CM

## 2023-12-14 DIAGNOSIS — Z98.891 HISTORY OF C-SECTION: ICD-10-CM

## 2023-12-14 DIAGNOSIS — Z3A.12 12 WEEKS GESTATION OF PREGNANCY: ICD-10-CM

## 2023-12-14 DIAGNOSIS — O09.299 HISTORY OF PRE-ECLAMPSIA IN PRIOR PREGNANCY, CURRENTLY PREGNANT: ICD-10-CM

## 2023-12-14 DIAGNOSIS — O09.899 HISTORY OF PRETERM DELIVERY, CURRENTLY PREGNANT: ICD-10-CM

## 2023-12-14 DIAGNOSIS — O09.521 HIGH RISK PREGNANCY, MULTIGRAVIDA OF ADVANCED MATERNAL AGE IN FIRST TRIMESTER: Primary | ICD-10-CM

## 2023-12-14 DIAGNOSIS — D18.1 CYSTIC HYGROMA: ICD-10-CM

## 2023-12-14 DIAGNOSIS — O35.8XX0 CYSTIC HYGROMA OF FETUS IN SINGLETON PREGNANCY: ICD-10-CM

## 2023-12-14 DIAGNOSIS — O99.340 ANTEPARTUM MENTAL DISORDERS OF MOTHER: ICD-10-CM

## 2023-12-14 PROBLEM — Z87.42 HISTORY OF ABNORMAL CERVICAL PAP SMEAR: Status: RESOLVED | Noted: 2023-11-27 | Resolved: 2023-12-14

## 2023-12-14 PROCEDURE — 99204 OFFICE O/P NEW MOD 45 MIN: CPT | Performed by: OBSTETRICS & GYNECOLOGY

## 2023-12-14 PROCEDURE — 76813 OB US NUCHAL MEAS 1 GEST: CPT | Performed by: OBSTETRICS & GYNECOLOGY

## 2023-12-14 PROCEDURE — 76817 TRANSVAGINAL US OBSTETRIC: CPT | Performed by: OBSTETRICS & GYNECOLOGY

## 2023-12-14 ASSESSMENT — PATIENT HEALTH QUESTIONNAIRE - PHQ9
2. FEELING DOWN, DEPRESSED OR HOPELESS: 0
1. LITTLE INTEREST OR PLEASURE IN DOING THINGS: 0
SUM OF ALL RESPONSES TO PHQ QUESTIONS 1-9: 0
SUM OF ALL RESPONSES TO PHQ9 QUESTIONS 1 & 2: 0
SUM OF ALL RESPONSES TO PHQ QUESTIONS 1-9: 0

## 2023-12-14 NOTE — PROGRESS NOTES
[address] [place] [id number]    7 [facility], [address] [place] [address]  P- [phone] w-[phone]         MFM Consultation    Presents for evaluation of the following chief complaint(s):   Chief Complaint   Patient presents with    High Risk Pregnancy     AMA, Anxiety/Depression, BMI >35, Hx C/S, Hx PTD, Hx PreE, Suspected fetal hydrops seen at [facility] on 12/14/23. Pregnancy Ultrasound     NT/ viability/ cystic hygroma         Vikram Hassan (1985) is a 41 y.o. [id number] at 12w1d with 6/26/2024, Date entered prior to episode creation. Patient is working full time remotely as . Pt is scheduled to see [facility] ([address]) on 12/20/23. Reports HA's;   Denies Edema. No bleeding or LOF. Reports cramping , seen at [facility] for LLQ pain; LOV simple cyst noted. No vaginal pressure recently. Mood evaluated today based on discussion with pt and PHQ screen. 12/14/2023     9:41 AM   PHQ-9    Little interest or pleasure in doing things 0   Feeling down, depressed, or hopeless 0   PHQ-2 Score 0   PHQ-9 Total Score 0      Mood Reassuring today  As mood stable and depression/anxiety well-controlled, will not change medications today. Personal and family history reviewed and updated as indicated. Addressed normal pregnancy complaints, reassured and offered suggestions for care  Reviewed gestational age precautions and activity goals/limitations  Nutritional counseling as well as specific goals based on current maternal and fetal status  Options for GERD, constipation, other common complaints reviewed. Reviewed gestational age appropriate preventive care regarding communicable disease transmission and vaccines as appropriate (including flu, TDaP >28wk, RSV 32-36wk, and COVID.)  Mood counseling today    Exam:     Vitals:    12/14/23 0937   BP: 024/22      Applicable labs reviewed. Please see formal ultrasound report under imaging tab.

## 2023-12-14 NOTE — ASSESSMENT & PLAN NOTE
Advanced Maternal Age (Maternal Age 28 or greater at [facility])  First and Second Trimester  The risk of fetal aneuploidy based on maternal age should be reviewed with patient either with physicians or genetic counselor, or both. Testing for fetal aneuploidy can be divided into invasive and non-invasive tests. Invasive testing, which includes amniocentesis and chorionic villus sampling, is diagnostic. Non-invasive testing of maternal blood (for either hormone levels or cell-free fetal DNA), with or without ultrasound examination, is a screening test and requires follow-up testing of patients with screen-positive results. Given the increased risk of congenital anomalies in older women, a detailed second trimester ultrasound examination to assess for significant structural anomalies (particularly cardiac defects) is recommended. Genetic counseling was performed by physician after reviewing patient's genetic history. Ultrasound alone does not rule out all abnormalities of genetics and development. Maternal serum screening for aneuploidy was discussed with the patient including first trimester AMARA-A/hCG, second trimester Quad screen (either in isolation or sequential with AMARA-A) as well as non-invasive prenatal testing (NIPT) for aneuploidy from a maternal blood sample. Positive predictive and negative predictive values for these tests were explained, questions answered. Patient understands that these are screening tests that only assesses risk for select abnormalities (trisomies 15, 25, and 21, and sex chromosome abnormalities (NIPT), as well as markers for placental health (AMARA-A) and risk for open neural tube defects (quad)). NIPT is designed for high risk populations, but should be considered by all patients who desire the current best option for screening for applicable genetic abnormalities.      Limitations of technology discussed based on maternal age, technical aspects of tests, and maternal BMI

## 2023-12-14 NOTE — ASSESSMENT & PLAN NOTE
Plan: NIPT w/ [place] screening. Weekly viability checks. Targeted fetal anatomy ultrasound at 18-20 weeks. Consider amniocentesis after 15 weeks depending on NIPT results.

## 2023-12-14 NOTE — ASSESSMENT & PLAN NOTE
Preconception BMI ? 30 increases risk for pregnancy complications, including gestational diabetes, poor or accelerated fetal growth, hypertensive disorders of pregnancy, and abnormal labor progression. In addition, there is an increased risk of fetal demise, as well as congenital anomalies including neural tube defects, cardiac malformations, orofacial defects, and limb reduction abnormalities. The risk for stillbirth increases with increasing obesity: class I obesity 1.3 [1.2-1.4], class II obesity 1.4 [1.3-1.6], class III obesity 1.9 [1.3-1.6]) and higher stillbirth risk in  obese women (1.9 [1.7-2.1]) than in  obese women (1.4 [1.3-1.5]). Among women with class III obesity (BMI ?40 kg/m2), the risk for stillbirth increased with advancing gestational age: 27 to 29 weeks, hazard and risk ratios 1.40 and 1.69, respectively; 37 to 39 weeks, hazard and risk ratios 3.20 and 2.95, respectively; and 40 to 42 weeks, hazard and risk ratios 3.30 and 8.95, respectively. Recommend detailed first trimester ultrasound with NT at 12-13 weeks. Recommend level II ultrasound for anatomy and fetal echo if prepregnancy BMI >30-35 based on AIUM guidelines. Consider  testing beginning at 32-36 weeks due to risks of fetal demise, timing dependent on maternal and fetal comorbidities. Early evaluation of insulin resistance with HgbA1c at initiation of care- if a1c > 5.5, then follow up with either \"2 step\" 1hr GCT/ 3hr GTT OR \"1 step\" 2hr GTT  Closely monitor blood pressure for development/worsening of hypertensive disorders of pregnancy. Weight Gain Goal: <15 pounds, it is ok to stay same weight or lose as long as baby growing well  Dietary choices- low carb fine, avoid extreme keto (goal >50-75gm carb/day); not to use intermittent/prolonged fasting without specific discussion with physician.    Continue activity/exercise     [employer]

## 2023-12-16 LAB
BACTERIA SPEC CULT: NORMAL
BACTERIA SPEC CULT: NORMAL
SERVICE CMNT-IMP: NORMAL

## 2023-12-17 LAB
A VAGINAE DNA VAG QL NAA+PROBE: NORMAL SCORE
BVAB2 DNA VAG QL NAA+PROBE: NORMAL SCORE
C ALBICANS DNA VAG QL NAA+PROBE: NEGATIVE
C GLABRATA DNA VAG QL NAA+PROBE: NEGATIVE
MEGA1 DNA VAG QL NAA+PROBE: NORMAL SCORE
SPECIMEN SOURCE: NORMAL
T VAGINALIS RRNA SPEC QL NAA+PROBE: NEGATIVE

## 2023-12-21 PROBLEM — O99.341 MENTAL DISORDER DURING PREGNANCY IN FIRST TRIMESTER: Status: ACTIVE | Noted: 2023-11-27

## 2023-12-21 PROBLEM — O02.1 MISSED ABORTION: Status: ACTIVE | Noted: 2023-12-22

## 2023-12-22 ENCOUNTER — PREP FOR PROCEDURE (OUTPATIENT)
Dept: OBGYN CLINIC | Age: 38
End: 2023-12-22

## 2023-12-22 DIAGNOSIS — O02.1 MISSED ABORTION: ICD-10-CM

## 2023-12-28 LAB
Lab: NORMAL
NTRA 1P36 DELETION SYNDROME POPULATION-BASED RISK TEXT: NORMAL
NTRA 1P36 DELETION SYNDROME RESULT TEXT: NORMAL
NTRA 1P36 DELETION SYNDROME RISK SCORE TEXT: NORMAL
NTRA 22Q11.2 DELETION SYNDROME POPULATION-BASED RISK TEXT: NORMAL
NTRA 22Q11.2 DELETION SYNDROME RESULT TEXT: NORMAL
NTRA 22Q11.2 DELETION SYNDROME RISK SCORE TEXT: NORMAL
NTRA ANGELMAN SYNDROME POPULATION-BASED RISK TEXT: NORMAL
NTRA ANGELMAN SYNDROME RESULT TEXT: NORMAL
NTRA ANGELMAN SYNDROME RISK SCORE TEXT: NORMAL
NTRA CRI-DU-CHAT SYNDROME POPULATION-BASED RISK TEXT: NORMAL
NTRA CRI-DU-CHAT SYNDROME RESULT TEXT: NORMAL
NTRA CRI-DU-CHAT SYNDROME RISK SCORE TEXT: NORMAL
NTRA FETAL FRACTION SECOND FETUS: NORMAL
NTRA FETAL FRACTION: NORMAL
NTRA GENDER OF FETUS: NORMAL
NTRA GENDER OF SECOND FETUS: NORMAL
NTRA MONOSOMY X AGE-BASED RISK TEXT: NORMAL
NTRA MONOSOMY X RESULT TEXT: NORMAL
NTRA MONOSOMY X RISK SCORE TEXT: NORMAL
NTRA PDF REPORT: NORMAL
NTRA PRADER-WILLI SYNDROME POPULATION-BASED RISK TEXT: NORMAL
NTRA PRADER-WILLI SYNDROME RESULT TEXT: NORMAL
NTRA PRADER-WILLI SYNDROME RISK SCORE TEXT: NORMAL
NTRA TRIPLOIDY 13 18 AGE-BASED RISK TEXT: NORMAL
NTRA TRIPLOIDY 13 18 RESULT TEXT: NORMAL
NTRA TRIPLOIDY 13 18 RISK SCORE TEXT: NORMAL
NTRA TRIPLOIDY RESULT TEXT: NORMAL
NTRA TRISOMY 13 AGE-BASED RISK TEXT: NORMAL
NTRA TRISOMY 13 RESULT TEXT: NORMAL
NTRA TRISOMY 13 RISK SCORE TEXT: NORMAL
NTRA TRISOMY 18 AGE-BASED RISK TEXT: NORMAL
NTRA TRISOMY 18 RESULT TEXT: NORMAL
NTRA TRISOMY 18 RISK SCORE TEXT: NORMAL
NTRA TRISOMY 21 AGE-BASED RISK TEXT: NORMAL
NTRA TRISOMY 21 RESULT TEXT: NORMAL
NTRA TRISOMY 21 RISK SCORE TEXT: NORMAL
NTRA ZYGOSITY: NORMAL

## 2024-01-03 ENCOUNTER — TELEPHONE (OUTPATIENT)
Dept: OBGYN CLINIC | Age: 39
End: 2024-01-03

## 2024-01-03 LAB
Lab: NORMAL
NTRA FETAL FRACTION: NORMAL
NTRA GENDER OF FETUS: NORMAL
NTRA MONOSOMY X AGE-BASED RISK TEXT: NORMAL
NTRA MONOSOMY X RESULT TEXT: NORMAL
NTRA MONOSOMY X RISK SCORE TEXT: NORMAL
NTRA TRIPLOIDY RESULT TEXT: NORMAL
NTRA TRISOMY 13 AGE-BASED RISK TEXT: NORMAL
NTRA TRISOMY 13 RESULT TEXT: NORMAL
NTRA TRISOMY 13 RISK SCORE TEXT: NORMAL
NTRA TRISOMY 18 AGE-BASED RISK TEXT: NORMAL
NTRA TRISOMY 18 RESULT TEXT: NORMAL
NTRA TRISOMY 18 RISK SCORE TEXT: NORMAL
NTRA TRISOMY 21 AGE-BASED RISK TEXT: NORMAL
NTRA TRISOMY 21 RESULT TEXT: NORMAL
NTRA TRISOMY 21 RISK SCORE TEXT: NORMAL

## 2024-01-03 NOTE — TELEPHONE ENCOUNTER
Received call from Arielle requesting additional information on Anora specimen sent at time of D and C.  States it has missing identifiers.  Since this specimen was sent from the hospital, the hospital number for preop was given for them to call.  [phone] Spoke with Riley Guzmán and given information.

## 2024-01-04 ENCOUNTER — OFFICE VISIT (OUTPATIENT)
Dept: OBGYN CLINIC | Age: 39
End: 2024-01-04

## 2024-01-04 VITALS
SYSTOLIC BLOOD PRESSURE: 138 MMHG | BODY MASS INDEX: 36.5 KG/M2 | HEIGHT: 66 IN | WEIGHT: 227.1 LBS | DIASTOLIC BLOOD PRESSURE: 80 MMHG

## 2024-01-04 DIAGNOSIS — Z09 POSTOPERATIVE EXAMINATION: Primary | ICD-10-CM

## 2024-01-04 DIAGNOSIS — G47.00 INSOMNIA, UNSPECIFIED TYPE: ICD-10-CM

## 2024-01-04 PROCEDURE — 99024 POSTOP FOLLOW-UP VISIT: CPT | Performed by: OBSTETRICS & GYNECOLOGY

## 2024-01-04 RX ORDER — ZOLPIDEM TARTRATE 10 MG/1
10 TABLET ORAL NIGHTLY PRN
Qty: 20 TABLET | Refills: 0 | Status: SHIPPED | OUTPATIENT
Start: 2024-01-04 | End: 2024-01-24

## 2024-05-06 ENCOUNTER — OFFICE VISIT (OUTPATIENT)
Dept: OBGYN CLINIC | Age: 39
End: 2024-05-06
Payer: COMMERCIAL

## 2024-05-06 VITALS
DIASTOLIC BLOOD PRESSURE: 86 MMHG | HEIGHT: 66 IN | BODY MASS INDEX: 35.68 KG/M2 | WEIGHT: 222 LBS | SYSTOLIC BLOOD PRESSURE: 126 MMHG

## 2024-05-06 DIAGNOSIS — Z01.419 WELL WOMAN EXAM: Primary | ICD-10-CM

## 2024-05-06 DIAGNOSIS — Z13.89 SCREENING FOR GENITOURINARY CONDITION: ICD-10-CM

## 2024-05-06 LAB
BILIRUBIN, URINE, POC: NEGATIVE
BLOOD URINE, POC: NEGATIVE
GLUCOSE URINE, POC: NEGATIVE
KETONES, URINE, POC: NEGATIVE
LEUKOCYTE ESTERASE, URINE, POC: NEGATIVE
NITRITE, URINE, POC: NEGATIVE
PH, URINE, POC: 7 (ref 4.6–8)
PROTEIN,URINE, POC: NEGATIVE
SPECIFIC GRAVITY, URINE, POC: 1.03 (ref 1–1.03)
URINALYSIS CLARITY, POC: CLEAR
URINALYSIS COLOR, POC: YELLOW
UROBILINOGEN, POC: NORMAL

## 2024-05-06 PROCEDURE — 81002 URINALYSIS NONAUTO W/O SCOPE: CPT | Performed by: NURSE PRACTITIONER

## 2024-05-06 PROCEDURE — 99395 PREV VISIT EST AGE 18-39: CPT | Performed by: NURSE PRACTITIONER

## 2024-05-06 NOTE — PROGRESS NOTES
Patient presents today for a routine gynecological examination with no complaints.    OB History          3    Para   1    Term           1    AB   2    Living   1         SAB   2    IAB        Ectopic        Molar        Multiple        Live Births   1              Last pap smear: 22 (Negative, HR HPV Negative gc/chl/trich neg)  Last mammogram: Over 5 years ago- WNL  Last colonoscopy: 2023 (WNL)  Last DEXA: NA    GYN History         Patient's last menstrual period was 2024 (exact date). Cycle Length 28 Lasting 3 negative dysmenorrhea    Past Medical History:  Past Medical History:   Diagnosis Date    Abnormal Pap smear of cervix 2018    HPV+    Anemia     ORAL FE, DENIES HX OF BLOOD TRANSFUSIONS    Anxiety     ASCUS with positive high risk HPV cervical 2018    History of abnormal cervical Pap smear 2023-ASCUS + HR HPV.  2022-NILM neg HPV    Preeclampsia     with first pregnancy    Vertigo        Past Surgical History:  Past Surgical History:   Procedure Laterality Date     SECTION      x1    COLONOSCOPY  2022    COLPOSCOPY  2015    With Bx- LGSIL    DILATION AND CURETTAGE OF UTERUS  2020    DILATION AND CURETTAGE OF UTERUS N/A 2023    DILATATION AND CURETTAGE SUCTION/ 13w1d/ O+/ US GUIDANCE performed by Natalee Carbone DO at [facility] MAIN OR    LIPECTOMY N/A 2022    LIPOSUCTION OF TRUNK/COSMETIC performed by JULISSA Stephens Jr., MD at [facility]       Allergies:   No Known Allergies    Medication History:  Current Outpatient Medications   Medication Sig Dispense Refill    sertraline (ZOLOFT) 100 MG tablet Take 1 tablet by mouth at bedtime 30 tablet 9     No current facility-administered medications for this visit.       Social History:  Social History     Socioeconomic History    Marital status: Single     Spouse name: Not on file    Number of children: Not on file    Years of education: Not on file    Highest education level: Not on file

## 2024-12-29 ENCOUNTER — HOSPITAL ENCOUNTER (EMERGENCY)
Age: 39
Discharge: HOME | End: 2024-12-29
Attending: EMERGENCY MEDICINE
Payer: COMMERCIAL

## 2024-12-29 VITALS
RESPIRATION RATE: 17 BRPM | BODY MASS INDEX: 35.36 KG/M2 | HEART RATE: 147 BPM | WEIGHT: 220 LBS | DIASTOLIC BLOOD PRESSURE: 78 MMHG | SYSTOLIC BLOOD PRESSURE: 113 MMHG | TEMPERATURE: 101.5 F | OXYGEN SATURATION: 94 % | HEIGHT: 66 IN

## 2024-12-29 DIAGNOSIS — J11.1 INFLUENZA: Primary | ICD-10-CM

## 2024-12-29 LAB
FLUAV RNA SPEC QL NAA+PROBE: DETECTED
FLUBV RNA SPEC QL NAA+PROBE: NOT DETECTED
SARS-COV-2 RDRP RESP QL NAA+PROBE: NOT DETECTED
SOURCE: NORMAL

## 2024-12-29 PROCEDURE — 6360000002 HC RX W HCPCS: Performed by: EMERGENCY MEDICINE

## 2024-12-29 PROCEDURE — 96361 HYDRATE IV INFUSION ADD-ON: CPT

## 2024-12-29 PROCEDURE — 87635 SARS-COV-2 COVID-19 AMP PRB: CPT

## 2024-12-29 PROCEDURE — 99284 EMERGENCY DEPT VISIT MOD MDM: CPT

## 2024-12-29 PROCEDURE — 6370000000 HC RX 637 (ALT 250 FOR IP)

## 2024-12-29 PROCEDURE — 96374 THER/PROPH/DIAG INJ IV PUSH: CPT

## 2024-12-29 PROCEDURE — 2580000003 HC RX 258: Performed by: EMERGENCY MEDICINE

## 2024-12-29 PROCEDURE — 87502 INFLUENZA DNA AMP PROBE: CPT

## 2024-12-29 RX ORDER — BENZONATATE 200 MG/1
200 CAPSULE ORAL 3 TIMES DAILY PRN
Qty: 30 CAPSULE | Refills: 0 | Status: SHIPPED | OUTPATIENT
Start: 2024-12-29 | End: 2025-01-08

## 2024-12-29 RX ORDER — 0.9 % SODIUM CHLORIDE 0.9 %
1000 INTRAVENOUS SOLUTION INTRAVENOUS ONCE
Status: COMPLETED | OUTPATIENT
Start: 2024-12-29 | End: 2024-12-29

## 2024-12-29 RX ORDER — OSELTAMIVIR PHOSPHATE 75 MG/1
75 CAPSULE ORAL 2 TIMES DAILY
Qty: 10 CAPSULE | Refills: 0 | Status: SHIPPED | OUTPATIENT
Start: 2024-12-29 | End: 2025-01-03

## 2024-12-29 RX ORDER — ACETAMINOPHEN 500 MG
1000 TABLET ORAL
Status: COMPLETED | OUTPATIENT
Start: 2024-12-29 | End: 2024-12-29

## 2024-12-29 RX ORDER — IBUPROFEN 800 MG/1
800 TABLET, FILM COATED ORAL
Status: DISCONTINUED | OUTPATIENT
Start: 2024-12-29 | End: 2024-12-29

## 2024-12-29 RX ORDER — KETOROLAC TROMETHAMINE 15 MG/ML
15 INJECTION, SOLUTION INTRAMUSCULAR; INTRAVENOUS ONCE
Status: COMPLETED | OUTPATIENT
Start: 2024-12-29 | End: 2024-12-29

## 2024-12-29 RX ORDER — KETOROLAC TROMETHAMINE 10 MG/1
10 TABLET, FILM COATED ORAL EVERY 6 HOURS PRN
Qty: 20 TABLET | Refills: 0 | Status: SHIPPED | OUTPATIENT
Start: 2024-12-29

## 2024-12-29 RX ORDER — ONDANSETRON 4 MG/1
4 TABLET, ORALLY DISINTEGRATING ORAL 3 TIMES DAILY PRN
Qty: 21 TABLET | Refills: 0 | Status: SHIPPED | OUTPATIENT
Start: 2024-12-29

## 2024-12-29 RX ADMIN — SODIUM CHLORIDE 1000 ML: 9 INJECTION, SOLUTION INTRAVENOUS at 21:19

## 2024-12-29 RX ADMIN — ACETAMINOPHEN 1000 MG: 500 TABLET ORAL at 20:31

## 2024-12-29 RX ADMIN — KETOROLAC TROMETHAMINE 15 MG: 15 INJECTION, SOLUTION INTRAMUSCULAR; INTRAVENOUS at 21:27

## 2024-12-29 ASSESSMENT — PAIN DESCRIPTION - LOCATION
LOCATION: GENERALIZED
LOCATION: GENERALIZED

## 2024-12-29 ASSESSMENT — PAIN - FUNCTIONAL ASSESSMENT: PAIN_FUNCTIONAL_ASSESSMENT: 0-10

## 2024-12-29 ASSESSMENT — PAIN SCALES - GENERAL
PAINLEVEL_OUTOF10: 7
PAINLEVEL_OUTOF10: 8
PAINLEVEL_OUTOF10: 8

## 2024-12-29 ASSESSMENT — PAIN DESCRIPTION - DESCRIPTORS: DESCRIPTORS: ACHING

## 2024-12-30 NOTE — ED PROVIDER NOTES
Emergency Department Provider Note       PCP: Shane Kelsey APRN - NP   Age: 39 y.o.   Sex: female     DISPOSITION Discharge - Pending Orders Complete 12/29/2024 09:27:40 PM    ICD-10-CM    1. Influenza  J11.1           Medical Decision Making     Patient is a 39-year-old female who presents with bodyaches muscle aches chills throat pain cold cough congestion.  Patient denies any vomiting has been tolerating p.o.'s.  Patient denies any dizziness with standing.    Differential diagnosis includes but is not limited to COVID, flu  Patient's physical exam is as stated.    Patient's laboratory testing is positive for influenza.    Patient is febrile and tacky likely tachycardia secondary to the febrile nature.  We did give Toradol as well as 1 L bolus of normal saline with improvement of her heart rate.  Patient denies any dizziness with standing or syncope.  We will DC home with prescription for Tamiflu as well as Zofran and Toradol, Tessalon Perles and outpatient follow-up with PCP.  All questions answered.       1 or more acute illnesses that pose a threat to life or bodily function.   Prescription drug management performed.  The following clinical decision tools were used in the care of this patient .  I independently ordered and reviewed each unique test.                         History     Patient is a 39-year-old female who presents with bodyaches muscle aches chills throat pain cold cough congestion.  Patient denies any vomiting has been tolerating p.o.'s.  Patient denies any dizziness with standing.          ROS     Review of Systems   Respiratory:  Positive for cough.    All other systems reviewed and are negative.       Physical Exam     Vitals signs and nursing note reviewed:  Vitals:    12/29/24 1948   BP: (!) 146/78   Pulse: (!) 147   Resp: 17   Temp: (!) 102.7 °F (39.3 °C)   TempSrc: Oral   SpO2: 97%   Weight: 99.8 kg (220 lb)   Height: 1.676 m (5' 6\")      Physical Exam  Vitals and nursing note

## 2024-12-30 NOTE — ED TRIAGE NOTES
Pt ambulatory to triage with steady gait. Pt reports generalized body aches, chills, sore throat, cough that started today. Pt states she took tylenol around 1430

## 2025-08-27 ENCOUNTER — OFFICE VISIT (OUTPATIENT)
Dept: OBGYN CLINIC | Age: 40
End: 2025-08-27
Payer: COMMERCIAL

## 2025-08-27 VITALS — BODY MASS INDEX: 38.08 KG/M2 | WEIGHT: 235.9 LBS | DIASTOLIC BLOOD PRESSURE: 78 MMHG | SYSTOLIC BLOOD PRESSURE: 118 MMHG

## 2025-08-27 DIAGNOSIS — Z12.4 SCREENING FOR CERVICAL CANCER: ICD-10-CM

## 2025-08-27 DIAGNOSIS — Z01.419 WELL WOMAN EXAM: Primary | ICD-10-CM

## 2025-08-27 DIAGNOSIS — Z13.89 SCREENING FOR GENITOURINARY CONDITION: ICD-10-CM

## 2025-08-27 DIAGNOSIS — Z11.51 SCREENING FOR HPV (HUMAN PAPILLOMAVIRUS): ICD-10-CM

## 2025-08-27 DIAGNOSIS — Z11.3 SCREENING FOR STD (SEXUALLY TRANSMITTED DISEASE): ICD-10-CM

## 2025-08-27 DIAGNOSIS — N89.8 VAGINAL DISCHARGE: ICD-10-CM

## 2025-08-27 LAB
BILIRUBIN, URINE, POC: NEGATIVE
BLOOD URINE, POC: NEGATIVE
GLUCOSE URINE, POC: NEGATIVE
HIV 1+2 AB+HIV1 P24 AG SERPL QL IA: NONREACTIVE
HIV 1/2 RESULT COMMENT: NORMAL
KETONES, URINE, POC: NORMAL
LEUKOCYTE ESTERASE, URINE, POC: NEGATIVE
NITRITE, URINE, POC: NEGATIVE
PH, URINE, POC: 6.5 (ref 4.6–8)
PROTEIN,URINE, POC: NORMAL
SPECIFIC GRAVITY, URINE, POC: 1.02 (ref 1–1.03)
URINALYSIS CLARITY, POC: CLEAR
URINALYSIS COLOR, POC: YELLOW
UROBILINOGEN, POC: NORMAL MG/DL

## 2025-08-27 PROCEDURE — 99213 OFFICE O/P EST LOW 20 MIN: CPT | Performed by: NURSE PRACTITIONER

## 2025-08-27 PROCEDURE — 81002 URINALYSIS NONAUTO W/O SCOPE: CPT | Performed by: NURSE PRACTITIONER

## 2025-08-27 PROCEDURE — 99459 PELVIC EXAMINATION: CPT | Performed by: NURSE PRACTITIONER

## 2025-08-27 PROCEDURE — 99395 PREV VISIT EST AGE 18-39: CPT | Performed by: NURSE PRACTITIONER

## 2025-08-27 RX ORDER — ALPRAZOLAM 0.25 MG
0.25 TABLET ORAL 2 TIMES DAILY PRN
COMMUNITY
Start: 2025-07-03

## 2025-08-27 RX ORDER — VALACYCLOVIR HYDROCHLORIDE 500 MG/1
500 TABLET, FILM COATED ORAL DAILY
Qty: 90 TABLET | Refills: 3 | Status: SHIPPED | OUTPATIENT
Start: 2025-08-27

## 2025-08-29 LAB
A VAGINAE DNA VAG QL NAA+PROBE: NORMAL SCORE
BVAB2 DNA VAG QL NAA+PROBE: NORMAL SCORE
C ALBICANS DNA VAG QL NAA+PROBE: NEGATIVE
C GLABRATA DNA VAG QL NAA+PROBE: NEGATIVE
C TRACH RRNA SPEC QL NAA+PROBE: NEGATIVE
MEGA1 DNA VAG QL NAA+PROBE: NORMAL SCORE
N GONORRHOEA RRNA SPEC QL NAA+PROBE: NEGATIVE
SPECIMEN SOURCE: NORMAL
T VAGINALIS RRNA SPEC QL NAA+PROBE: NEGATIVE

## 2025-08-30 LAB
COLLECTION METHOD: NORMAL
CYTOLOGIST CVX/VAG CYTO: NORMAL
CYTOLOGY CVX/VAG DOC THIN PREP: NORMAL
HPV APTIMA: NEGATIVE
Lab: NORMAL
PAP SOURCE: NORMAL
PATH REPORT.FINAL DX SPEC: NORMAL
STAT OF ADQ CVX/VAG CYTO-IMP: NORMAL

## (undated) DEVICE — GOWN,SIRUS,NONRNF,SETINSLV,XL,20/CS: Brand: MEDLINE

## (undated) DEVICE — NEEDLE HYPO 21GA L1.5IN INTRAMUSCULAR S STL LATCH BVL UP

## (undated) DEVICE — TRAY PREP DRY W/ PREM GLV 2 APPL 6 SPNG 2 UNDPD 1 OVERWRAP

## (undated) DEVICE — SURGICAL PROCEDURE PACK BASIC ST FRANCIS

## (undated) DEVICE — CURETTE UTER VAC CRV RIG 7MM -- GYRUS

## (undated) DEVICE — TOTAL TRAY, DB, 100% SILI FOLEY, 16FR 10: Brand: MEDLINE

## (undated) DEVICE — GARMENT,MEDLINE,DVT,INT,CALF,LG, GEN2: Brand: MEDLINE

## (undated) DEVICE — BANDAGE,GAUZE,BULKEE II,4.5"X4.1YD,STRL: Brand: MEDLINE

## (undated) DEVICE — TUBING ASPIR L8IN OD3/8IN CLR VYN DISP

## (undated) DEVICE — NEEDLE HYPO BLNT 18 GAX1 IN PLAS HUB W/O SYR FILTER SS PNK

## (undated) DEVICE — MINOR LITHOTOMY PACK: Brand: MEDLINE INDUSTRIES, INC.

## (undated) DEVICE — SUTURE CHROMIC GUT SZ 4-0 L18IN ABSRB BRN L19MM PS-2 3/8 1637G

## (undated) DEVICE — SYRINGE MED 10ML LUERLOCK TIP W/O SFTY DISP

## (undated) DEVICE — LINER CANSTR SUCTION 1500 CC GRAD SFT FLOAT VLV SHUT-OFF LID

## (undated) DEVICE — BANDAGE COMPR W6INXL10YD ST M E WHITE/BEIGE

## (undated) DEVICE — GLOVE SURG SZ 7 L12IN FNGR THK79MIL GRN LTX FREE

## (undated) DEVICE — NEEDLE HYPO 25GA L1.5IN BLU POLYPR HUB S STL REG BVL STR

## (undated) DEVICE — SPONGE LAPAROTOMY W18XL18IN WHITE STRUNG RADIOPAQUE STERILE

## (undated) DEVICE — INTENDED FOR TISSUE SEPARATION, AND OTHER PROCEDURES THAT REQUIRE A SHARP SURGICAL BLADE TO PUNCTURE OR CUT.: Brand: BARD-PARKER ® STAINLESS STEEL BLADES

## (undated) DEVICE — PVC URETHRAL CATHETER: Brand: DOVER

## (undated) DEVICE — GARMENT,MEDLINE,DVT,INT,CALF,MED, GEN2: Brand: MEDLINE

## (undated) DEVICE — Device: Brand: MEDCO MANUFACTURING INC

## (undated) DEVICE — GLOVE ORANGE PI 8 1/2   MSG9085

## (undated) DEVICE — CONTAINER SPEC HISTOLOGY 900ML POLYPR

## (undated) DEVICE — Device

## (undated) DEVICE — APPLICATOR MEDICATED 26 CC SOLUTION HI LT ORNG CHLORAPREP

## (undated) DEVICE — SOLUTION IV 1000ML 0.9% SOD CHL

## (undated) DEVICE — CARDINAL HEALTH FLEXIBLE LIGHT HANDLE COVER: Brand: CARDINAL HEALTH

## (undated) DEVICE — CYSTO: Brand: MEDLINE INDUSTRIES, INC.

## (undated) DEVICE — DRAPE TWL SURG 16X26IN BLU ORB04] ALLCARE INC]

## (undated) DEVICE — SOLUTION IRRIG 1000ML 09% SOD CHL USP PIC PLAS CONTAINER

## (undated) DEVICE — UNIVERSAL DRAPES: Brand: MEDLINE INDUSTRIES, INC.

## (undated) DEVICE — BLUNTFILL WITH FILTER: Brand: MONOJECT

## (undated) DEVICE — GOWN,REINF,POLY,ECL,PP SLV,XL: Brand: MEDLINE

## (undated) DEVICE — DRAPE,UNDERBUTTOCKS,PCH,STERILE: Brand: MEDLINE

## (undated) DEVICE — SHEET,DRAPE,53X77,STERILE: Brand: MEDLINE

## (undated) DEVICE — PAD MATERNITY 11IN W/TAILS -- STRL

## (undated) DEVICE — GAUZE,SPONGE,2"X2",8PLY,STERILE,LF,2'S: Brand: MEDLINE

## (undated) DEVICE — U.V.A.C. SWIVEL HANDLE W/TUBING, 6': Brand: CONVERTORS

## (undated) DEVICE — PAD,ABDOMINAL,5"X9",ST,LF,25/BX: Brand: MEDLINE INDUSTRIES, INC.

## (undated) DEVICE — REM POLYHESIVE ADULT PATIENT RETURN ELECTRODE: Brand: VALLEYLAB

## (undated) DEVICE — GAUZE,SPONGE,4"X4",16PLY,STRL,LF,10/TRAY: Brand: MEDLINE